# Patient Record
Sex: MALE | Race: BLACK OR AFRICAN AMERICAN | Employment: OTHER | URBAN - METROPOLITAN AREA
[De-identification: names, ages, dates, MRNs, and addresses within clinical notes are randomized per-mention and may not be internally consistent; named-entity substitution may affect disease eponyms.]

---

## 2017-06-07 ENCOUNTER — OFFICE VISIT (OUTPATIENT)
Dept: CARDIOLOGY CLINIC | Age: 72
End: 2017-06-07

## 2017-06-07 VITALS
HEART RATE: 90 BPM | DIASTOLIC BLOOD PRESSURE: 92 MMHG | HEIGHT: 66 IN | BODY MASS INDEX: 27.64 KG/M2 | SYSTOLIC BLOOD PRESSURE: 173 MMHG | WEIGHT: 172 LBS

## 2017-06-07 DIAGNOSIS — Z99.2 ESRD ON DIALYSIS (HCC): ICD-10-CM

## 2017-06-07 DIAGNOSIS — I10 ESSENTIAL HYPERTENSION, BENIGN: ICD-10-CM

## 2017-06-07 DIAGNOSIS — Z95.1 S/P CABG X 1: ICD-10-CM

## 2017-06-07 DIAGNOSIS — N18.6 ESRD ON DIALYSIS (HCC): ICD-10-CM

## 2017-06-07 DIAGNOSIS — I25.10 ATHEROSCLEROSIS OF NATIVE CORONARY ARTERY OF NATIVE HEART WITHOUT ANGINA PECTORIS: Primary | ICD-10-CM

## 2017-06-07 DIAGNOSIS — I08.0 MITRAL VALVE INSUFFICIENCY AND AORTIC VALVE INSUFFICIENCY: ICD-10-CM

## 2017-06-07 DIAGNOSIS — E78.5 OTHER AND UNSPECIFIED HYPERLIPIDEMIA: ICD-10-CM

## 2017-06-07 RX ORDER — METOPROLOL SUCCINATE 50 MG/1
50 TABLET, EXTENDED RELEASE ORAL DAILY
Qty: 90 TAB | Refills: 3 | Status: SHIPPED | OUTPATIENT
Start: 2017-06-07 | End: 2020-01-09

## 2017-06-07 RX ORDER — AMLODIPINE BESYLATE 2.5 MG/1
TABLET ORAL DAILY
COMMUNITY
End: 2020-01-09

## 2017-06-07 RX ORDER — ATORVASTATIN CALCIUM 10 MG/1
10 TABLET, FILM COATED ORAL DAILY
Qty: 90 TAB | Refills: 3 | Status: SHIPPED | OUTPATIENT
Start: 2017-06-07 | End: 2020-01-09

## 2017-06-07 RX ORDER — TAMSULOSIN HYDROCHLORIDE 0.4 MG/1
0.4 CAPSULE ORAL DAILY
COMMUNITY
End: 2020-01-09

## 2017-06-07 RX ORDER — WARFARIN SODIUM 5 MG/1
5 TABLET ORAL DAILY
COMMUNITY
End: 2020-01-09

## 2017-06-07 NOTE — PROGRESS NOTES
HISTORY OF PRESENT ILLNESS  Hollie Canchola is a 67 y.o. male. HPI Comments: Patient with cad,mr,ai,hcvd. On follow up patient denies any chest pains,sob, palpitation or other significant symptoms. cabg at [facility]-8/2016      Hypertension   The history is provided by the patient. This is a chronic problem. The problem occurs constantly. The problem has not changed since onset. Pertinent negatives include no chest pain and no shortness of breath. Review of Systems   Constitutional: Negative for chills and fever. HENT: Negative for nosebleeds. Eyes: Negative for blurred vision and double vision. Respiratory: Negative for cough, hemoptysis, sputum production, shortness of breath and wheezing. Cardiovascular: Negative for chest pain, palpitations, orthopnea, claudication, leg swelling and PND. Gastrointestinal: Negative for heartburn, nausea and vomiting. Musculoskeletal: Negative for myalgias. Skin: Negative for rash. Neurological: Negative for dizziness and weakness. Endo/Heme/Allergies: Does not bruise/bleed easily. Family History   Problem Relation Age of Onset    Hypertension Other        Past Medical History:   Diagnosis Date    Anemia NEC     Anemia in chronic kidney disease     BPH (benign prostatic hypertrophy)     Chronic kidney disease, unspecified     On dialysis    Coronary atherosclerosis of native coronary artery     Stable, no angina    ESRD on dialysis ([facility] [address] 5/18/10    Essential hypertension, benign     Elevated today, on multiple meds    Gout     Hypercholesterolemia     Hyperlipidemia     Mitral valve insufficiency and aortic valve insufficiency     Mod mr, mild ai    Osteomalacia, unspecified     Other and unspecified hyperlipidemia     On Lipitor.  LDL less than 60    Other chronic pulmonary heart diseases     PAP 50 MMHG    Pre-operative cardiovascular examination     For renal transplant    Pre-transplant evaluation for end stage renal disease     Renal mass     Secondary hyperparathyroidism (of renal origin)     Unspecified essential hypertension        Past Surgical History:   Procedure Laterality Date    HX KNEE ARTHROSCOPY      HX ORTHOPAEDIC      HX OTHER SURGICAL      Hx dialysis catheter     HX OTHER SURGICAL      Renal shunt    VASCULAR SURGERY PROCEDURE UNLIST  5/2010       No Known Allergies    Current Outpatient Prescriptions   Medication Sig    tamsulosin (FLOMAX) 0.4 mg capsule Take 0.4 mg by mouth daily.  warfarin (COUMADIN) 5 mg tablet Take 5 mg by mouth daily.  amLODIPine (NORVASC) 2.5 mg tablet Take  by mouth daily.  atorvastatin (LIPITOR) 10 mg tablet Take 1 Tab by mouth daily.  metoprolol succinate (TOPROL-XL) 50 mg XL tablet Take 1 Tab by mouth daily.  cholecalciferol (VITAMIN D3) 400 unit tab tablet Take  by mouth two (2) times a day.  aspirin 81 mg CpDR Take  by mouth.  cinacalcet (SENSIPAR) 30 mg tablet Take 60 mg by mouth daily.  isosorbide mononitrate ER (IMDUR) 30 mg tablet Take  by mouth daily.  sevelamer carbonate (RENVELA) 800 mg Tab tab Take  by mouth three (3) times daily. No current facility-administered medications for this visit. Visit Vitals    BP (!) 173/92    Pulse 90    Ht 5' 6\" (1.676 m)    Wt 78 kg (172 lb)    BMI 27.76 kg/m2         Physical Exam   Constitutional: He is oriented to person, place, and time. He appears well-developed and well-nourished. HENT:   Head: Normocephalic and atraumatic. Eyes: Conjunctivae are normal.   Neck: Neck supple. No JVD present. No tracheal deviation present. No thyromegaly present. Cardiovascular: Normal rate and regular rhythm. Exam reveals no gallop and no friction rub. Murmur heard. Holosystolic murmur is present with a grade of 2/6  at the lower left sternal border  Pulmonary/Chest: Breath sounds normal. No respiratory distress. He has no wheezes. He has no rales. He exhibits no tenderness.    Abdominal: Soft. There is no tenderness. Musculoskeletal: He exhibits no edema. Neurological: He is alert and oriented to person, place, and time. Skin: Skin is warm and dry. Psychiatric: He has a normal mood and affect. CARDIOLOGY STUDIES 8/1/2011 9/1/2010   Cardiac Cath Result - lad 80% mid long and aneurysmal dilatation, lcx, om1 100%, om3 small 40%, om5 30-40%, rca, aneurysmal dilatation, 90% pda, small   Echocardiogram - Complete Result EF 50%, PAP 50, mod MR, mod TR, mild AR; MODERATE PULMONARY HTN -   CORONARY ANGIOGRAPHY:cath:2010  Left Main Coronary Artery: The left main coronary artery is somewhat irregular and, like all of the coronaries, is somewhat aneurysmally dilated. The left main divides into the left anterior descending and circumflex branches in typical fashion. Left Anterior Descending Coronary Artery: The left anterior descending coronary artery gives rise to a very large first diagonal vessel. This vessel runs parallel to the left anterior descending over the anterolateral surface of the heart. The diagonal bifurcates terminally. The left anterior descending in the mid segment beyond the takeoff of the diagonal has a long area of 80% narrowing. Distally, the left anterior descending is a normal-caliber vessel and courses to the apex. The proximal portion of the left anterior descending at the site of the diagonal takeoff is aneurysmally dilated. Left Circumflex Coronary Artery: The left circumflex coronary artery consists of a total of 4 marginal vessels. The first is occluded with faint filling of the ghost distal vessel. The second is small and free of significant narrowing. The third has about 40% stenosis proximally but is small. A fourth is small and free of disease. The fifth marginal has some 30-40% narrowing at its origin. The entire AV groove portion of the circumflex is aneurysmally dilated and irregular.     Right Coronary Artery: The right coronary artery is dominant, giving rise to a small posterior descending branch and a posterior left ventricular branch. There is serial 20% stenosis in the proximal right with aneurysmal dilatation of the proximal right. The posterior descending is a small vessel and has a 90% stenosis distally. Beyond the stenosis, the vessel was extremely small and not suitable for coronary intervention or coronary artery bypass grafting. The posterior left ventricular branch of the right is totally occluded proximally and also is a tiny vessel. FINAL DIAGNOSES:  1. NORMAL LEFT VENTRICULAR FUNCTION. 2. THREE-VESSEL CORONARY ARTERY DISEASE. 3. ANEURYSMALLY DILATED CORONARY ARTERIES THAT ARE IRREGULAR AND ECTATIC. RECOMMENDATION: Mr. Viridiana Mccrary will have his risk factors optimized; specifically, he will be placed on aspirin and be certain that his lipid profile reflects a target LDL of less than 70. He will need functional testing to assess the possibility of the need for coronary intervention to the left anterior descending. He does have disease involving the first marginal, which is occluded; the posterolateral system of the right, which is occluded; and the posterior descending branch of the right, which has a high-grade stenosis but is a tiny vessel. However, the obtuse marginal, the posterolateral, and the posterior descending vessels are all exceedingly small and not suitable for coronary intervention or coronary artery bypass grafting. Ek2016  Sinus  Rhythm   -Left bundle branch block and left axis. ABNORMAL     SUMMARY:echo:2016  Left ventricle: Systolic function was normal. Ejection fraction was  estimated to be 55 %. No obvious wall motion abnormalities identified in  the views obtained. There was mild concentric hypertrophy. Doppler  parameters were consistent with abnormal left ventricular relaxation  (grade 1 diastolic dysfunction). Left atrium: The atrium was dilated. Mitral valve: There was trivial regurgitation.     Pulmonic valve: Transpulmonic velocity was increased due to increased  transvalvular flow. There was mild regurgitation. 8/2016  cabg  sinle vg to lad    [facility]results pending      Assessment       ICD-10-CM ICD-9-CM    1. Atherosclerosis of native coronary artery of native heart without angina pectoris I25.10 414.01     stable   2. Essential hypertension, benign I10 401.1     elevated  retstarted metoprolol   3. Other and unspecified hyperlipidemia E78.5 272.4     restrted lipitor   4. Mitral valve insufficiency and aortic valve insufficiency I08.0 396.3     mod mr,mild ai  stable     5. S/P CABG x 1 Z95.1 V45.81     vg to distal lad-8/2016   6. ESRD on dialysis ([facility] [address] N18.6 585.6     Z99.2 V45.11        Medications Discontinued During This Encounter   Medication Reason    atorvastatin (LIPITOR) 10 mg tablet Reorder    metoprolol succinate (TOPROL-XL) 25 mg XL tablet Reorder       Orders Placed This Encounter    atorvastatin (LIPITOR) 10 mg tablet     Sig: Take 1 Tab by mouth daily. Dispense:  90 Tab     Refill:  3    metoprolol succinate (TOPROL-XL) 50 mg XL tablet     Sig: Take 1 Tab by mouth daily. Dispense:  90 Tab     Refill:  3       Follow-up Disposition:  Return in about 6 months (around 12/7/2017).

## 2017-06-07 NOTE — MR AVS SNAPSHOT
Visit Information Date & Time Provider Department Dept. Phone Encounter #  
 6/7/2017 12:45 PM Jan Llamas MD [facility] [address] [place] [address] [id number] Follow-up Instructions Return in about 6 months (around 12/7/2017). Your Appointments 12/6/2017 10:45 AM  
Office Visit with Jan Llamas MD  
[facility]) Appt Note: [address] [facility], [address] [facility] [address] 5[address] Charleen Hess, [address] [place] [address] Upcoming Health Maintenance Date Due Hepatitis C Screening 1945 DTaP/Tdap/Td series (1 - Tdap) 5/30/1966 FOBT Q 1 YEAR AGE 50-75 5/30/1995 ZOSTER VACCINE AGE 60> 5/30/2005 GLAUCOMA SCREENING Q2Y 5/30/2010 Pneumococcal 65+ High/Highest Risk (1 of 2 - PCV13) 5/30/2010 MEDICARE YEARLY EXAM 5/30/2010 INFLUENZA AGE 9 TO ADULT 8/1/2017 Allergies as of 6/7/2017  Review Complete On: 6/7/2017 By: Jan Llamas MD  
 No Known Allergies Current Immunizations  Never Reviewed No immunizations on file. Not reviewed this visit You Were Diagnosed With   
  
 Codes Comments Atherosclerosis of native coronary artery of native heart without angina pectoris    -  Primary ICD-10-CM: I25.10 ICD-9-CM: 414.01 stable Essential hypertension, benign     ICD-10-CM: I10 
ICD-9-CM: 401.1 elevated 
retstarted metoprolol Other and unspecified hyperlipidemia     ICD-10-CM: E78.5 ICD-9-CM: 272.4 restrted lipitor Mitral valve insufficiency and aortic valve insufficiency     ICD-10-CM: I08.0 ICD-9-CM: 396.3 mod mr,mild ai 
stable S/P CABG x 1     ICD-10-CM: Z95.1 ICD-9-CM: V45.81 vg to distal lad-8/2016 ESRD on dialysis [facility])     ICD-10-CM: N18.6, Z99.2 ICD-9-CM: 585.6, V45.11 Vitals BP Pulse Height(growth percentile) Weight(growth percentile) BMI Smoking Status (!) 173/92 90 5' 6\" (1.676 m) 172 lb (78 kg) 27.76 kg/m2 Never Smoker Vitals History BMI and BSA Data Body Mass Index Body Surface Area  
 27.76 kg/m 2 1.91 m 2 Preferred Pharmacy Pharmacy Name Phone [address] [facility], [address] [place] [address] [phone] Your Updated Medication List  
  
   
This list is accurate as of: 6/7/17  1:02 PM.  Always use your most recent med list. amLODIPine 2.5 mg tablet Commonly known as:  [place] Staggers Take  by mouth daily. aspirin 81 mg Cpdr  
Take  by mouth. atorvastatin 10 mg tablet Commonly known as:  LIPITOR Take 1 Tab by mouth daily. cholecalciferol 400 unit Tab tablet Commonly known as:  VITAMIN D3 Take  by mouth two (2) times a day. isosorbide mononitrate ER 30 mg tablet Commonly known as:  IMDUR Take  by mouth daily. metoprolol succinate 50 mg XL tablet Commonly known as:  TOPROL-XL Take 1 Tab by mouth daily. RENVELA 800 mg Tab tab Generic drug:  sevelamer carbonate Take  by mouth three (3) times daily. SENSIPAR 30 mg tablet Generic drug:  cinacalcet Take 60 mg by mouth daily. tamsulosin 0.4 mg capsule Commonly known as:  FLOMAX Take 0.4 mg by mouth daily. warfarin 5 mg tablet Commonly known as:  COUMADIN Take 5 mg by mouth daily. Prescriptions Printed Refills  
 atorvastatin (LIPITOR) 10 mg tablet 3 Sig: Take 1 Tab by mouth daily. Class: Print Route: Oral  
 metoprolol succinate (TOPROL-XL) 50 mg XL tablet 3 Sig: Take 1 Tab by mouth daily. Class: Print Route: Oral  
  
Follow-up Instructions Return in about 6 months (around 12/7/2017). Introducing hospitals & HEALTH SERVICES! Dear Santy Power: 
Thank you for requesting a [employer] account.   Our records indicate that you have previously registered for a [employer] account but its currently inactive. Please call our [employer] support line at [phone]. Additional Information If you have questions, please visit the Frequently Asked Questions section of the [employer] website at [email or web address] Remember, [employer] is NOT to be used for urgent needs. For medical emergencies, dial 911. Now available from your iPhone and Android! Please provide this summary of care documentation to your next provider. Your primary care clinician is listed as [address] [place] [address]. If you have any questions after today's visit, please call [phone].

## 2017-06-07 NOTE — LETTER
Ermias Arriaza. 
[birth year] 
 
6/7/2017 Dear Nisha Mccallum MD 
 
I had the pleasure of evaluating  Mr. Joann Puentes in office today. Below are the relevant portions of my assessment and plan of care. ICD-10-CM ICD-9-CM 1. Atherosclerosis of native coronary artery of native heart without angina pectoris I25.10 414.01   
 stable 2. Essential hypertension, benign I10 401.1   
 elevated 
retstarted metoprolol 3. Other and unspecified hyperlipidemia E78.5 272.4   
 restrted lipitor 4. Mitral valve insufficiency and aortic valve insufficiency I08.0 396.3   
 mod mr,mild ai 
stable 5. S/P CABG x 1 Z95.1 V45.81   
 vg to distal lad-8/2016 Current Outpatient Prescriptions Medication Sig Dispense Refill  tamsulosin (FLOMAX) 0.4 mg capsule Take 0.4 mg by mouth daily.  warfarin (COUMADIN) 5 mg tablet Take 5 mg by mouth daily.  amLODIPine (NORVASC) 2.5 mg tablet Take  by mouth daily.  cholecalciferol (VITAMIN D3) 400 unit tab tablet Take  by mouth two (2) times a day.  aspirin 81 mg CpDR Take  by mouth.  cinacalcet (SENSIPAR) 30 mg tablet Take 60 mg by mouth daily.  metoprolol succinate (TOPROL-XL) 25 mg XL tablet Take  by mouth daily.  isosorbide mononitrate ER (IMDUR) 30 mg tablet Take  by mouth daily.  atorvastatin (LIPITOR) 10 mg tablet Take 1 Tab by mouth daily. 90 Tab 3  
 sevelamer carbonate (RENVELA) 800 mg Tab tab Take  by mouth three (3) times daily. Orders Placed This Encounter  tamsulosin (FLOMAX) 0.4 mg capsule Sig: Take 0.4 mg by mouth daily.  warfarin (COUMADIN) 5 mg tablet Sig: Take 5 mg by mouth daily.  amLODIPine (NORVASC) 2.5 mg tablet Sig: Take  by mouth daily. If you have questions, please do not hesitate to call me. I look forward to following Mr. Joann Puentes along with you. Sincerely, Mani Davidson MD

## 2020-01-09 ENCOUNTER — OFFICE VISIT (OUTPATIENT)
Dept: CARDIOLOGY CLINIC | Age: 75
End: 2020-01-09

## 2020-01-09 VITALS
OXYGEN SATURATION: 99 % | SYSTOLIC BLOOD PRESSURE: 136 MMHG | DIASTOLIC BLOOD PRESSURE: 74 MMHG | HEIGHT: 66 IN | WEIGHT: 165 LBS | HEART RATE: 77 BPM | BODY MASS INDEX: 26.52 KG/M2

## 2020-01-09 DIAGNOSIS — Z95.1 S/P CABG X 1: ICD-10-CM

## 2020-01-09 DIAGNOSIS — I35.0 NONRHEUMATIC AORTIC VALVE STENOSIS: ICD-10-CM

## 2020-01-09 DIAGNOSIS — Z99.2 ESRD ON DIALYSIS (HCC): ICD-10-CM

## 2020-01-09 DIAGNOSIS — J90 PLEURAL EFFUSION ON RIGHT: ICD-10-CM

## 2020-01-09 DIAGNOSIS — E78.5 HYPERLIPIDEMIA, UNSPECIFIED HYPERLIPIDEMIA TYPE: ICD-10-CM

## 2020-01-09 DIAGNOSIS — I10 ESSENTIAL HYPERTENSION, BENIGN: ICD-10-CM

## 2020-01-09 DIAGNOSIS — I42.9 CARDIOMYOPATHY, UNSPECIFIED TYPE (HCC): ICD-10-CM

## 2020-01-09 DIAGNOSIS — I25.118 ATHEROSCLEROSIS OF NATIVE CORONARY ARTERY OF NATIVE HEART WITH OTHER FORM OF ANGINA PECTORIS (HCC): Primary | ICD-10-CM

## 2020-01-09 DIAGNOSIS — I08.0 MITRAL VALVE INSUFFICIENCY AND AORTIC VALVE INSUFFICIENCY: ICD-10-CM

## 2020-01-09 DIAGNOSIS — R06.02 SOB (SHORTNESS OF BREATH) ON EXERTION: ICD-10-CM

## 2020-01-09 DIAGNOSIS — N18.6 ESRD ON DIALYSIS (HCC): ICD-10-CM

## 2020-01-09 RX ORDER — LORATADINE 10 MG/1
10 TABLET ORAL
COMMUNITY

## 2020-01-09 RX ORDER — DOXYCYCLINE 100 MG/1
100 TABLET ORAL 2 TIMES DAILY
COMMUNITY
End: 2020-04-02

## 2020-01-09 RX ORDER — CARVEDILOL 25 MG/1
25 TABLET ORAL 2 TIMES DAILY WITH MEALS
COMMUNITY

## 2020-01-09 RX ORDER — LISINOPRIL 5 MG/1
5 TABLET ORAL DAILY
Qty: 90 TAB | Refills: 3 | Status: SHIPPED | OUTPATIENT
Start: 2020-01-09

## 2020-01-09 RX ORDER — ATORVASTATIN CALCIUM 20 MG/1
20 TABLET, FILM COATED ORAL DAILY
Qty: 90 TAB | Refills: 3 | Status: SHIPPED | OUTPATIENT
Start: 2020-01-09 | End: 2020-03-06 | Stop reason: SDUPTHER

## 2020-01-09 RX ORDER — LISINOPRIL 5 MG/1
5 TABLET ORAL DAILY
Qty: 90 TAB | Refills: 3 | Status: SHIPPED | OUTPATIENT
Start: 2020-01-09 | End: 2020-01-09 | Stop reason: SDUPTHER

## 2020-01-09 NOTE — PROGRESS NOTES
1. Have you been to the ER, urgent care clinic since your last visit? Hospitalized since your last visit? Yes [facility] non cardiac    2. Have you seen or consulted any other health care providers outside of the [address] [place] [address] since your last visit? Include any pap smears or colon screening.   Yes [facility]

## 2020-01-09 NOTE — PROGRESS NOTES
HISTORY OF PRESENT ILLNESS  Giovanni Botello is a 76 y.o. male. Patient with cad,mr,ai,hcvd. On follow up patient denies any chest pains,sob, palpitation or other significant symptoms. cabg at [facility]-8/2016 1/2020  Patient is seen today for cardiac evaluation. His increased shortness of breath. He was noted to have a right pleural effusion which was tapped at [facility].  Shortness of breath is improved since then he denies any chest pain at present. Hypertension   The history is provided by the patient. This is a chronic problem. The problem occurs constantly. The problem has not changed since onset. Associated symptoms include shortness of breath. Pertinent negatives include no chest pain. Review of Systems   Constitutional: Negative for chills and fever. HENT: Negative for nosebleeds. Eyes: Negative for blurred vision and double vision. Respiratory: Positive for shortness of breath. Negative for cough, hemoptysis, sputum production and wheezing. Cardiovascular: Negative for chest pain, palpitations, orthopnea, claudication, leg swelling and PND. Gastrointestinal: Negative for heartburn, nausea and vomiting. Musculoskeletal: Negative for myalgias. Skin: Negative for rash. Neurological: Negative for dizziness and weakness. Endo/Heme/Allergies: Does not bruise/bleed easily.      Family History   Problem Relation Age of Onset    Hypertension Other        Past Medical History:   Diagnosis Date    Anemia NEC     Anemia in chronic kidney disease     BPH (benign prostatic hypertrophy)     Chronic kidney disease, unspecified     On dialysis    Coronary atherosclerosis of native coronary artery     Stable, no angina    ESRD on dialysis ([facility] [address] 5/18/10    Essential hypertension, benign     Elevated today, on multiple meds    Gout     Hypercholesterolemia     Hyperlipidemia     Mitral valve insufficiency and aortic valve insufficiency     Mod mr, mild ai    Osteomalacia, unspecified     Other and unspecified hyperlipidemia     On Lipitor. LDL less than 60    Other chronic pulmonary heart diseases     PAP 50 MMHG    Pre-operative cardiovascular examination     For renal transplant    Pre-transplant evaluation for end stage renal disease     Renal mass     Secondary hyperparathyroidism (of renal origin)     Unspecified essential hypertension        Past Surgical History:   Procedure Laterality Date    HX KNEE ARTHROSCOPY      HX ORTHOPAEDIC      HX OTHER SURGICAL      Hx dialysis catheter     HX OTHER SURGICAL      Renal shunt    VASCULAR SURGERY PROCEDURE UNLIST  5/2010       No Known Allergies    Current Outpatient Medications   Medication Sig    loratadine (CLARITIN) 10 mg tablet Take 10 mg by mouth.  carvedilol (COREG) 25 mg tablet Take 25 mg by mouth two (2) times daily (with meals).  doxycycline (ADOXA) 100 mg tablet Take 100 mg by mouth two (2) times a day.  lisinopril (PRINIVIL, ZESTRIL) 5 mg tablet Take 1 Tab by mouth daily.  atorvastatin (LIPITOR) 20 mg tablet Take 1 Tab by mouth daily.  cholecalciferol (VITAMIN D3) 400 unit tab tablet Take  by mouth two (2) times a day.  aspirin 81 mg CpDR Take  by mouth.  cinacalcet (SENSIPAR) 30 mg tablet Take 60 mg by mouth daily.  sevelamer carbonate (RENVELA) 800 mg Tab tab Take  by mouth three (3) times daily. No current facility-administered medications for this visit. Visit Vitals  /74   Pulse 77   Ht 5' 6\" (1.676 m)   Wt 74.8 kg (165 lb)   SpO2 99%   BMI 26.63 kg/m²         Physical Exam   Constitutional: He is oriented to person, place, and time. He appears well-developed and well-nourished. HENT:   Head: Normocephalic and atraumatic. Eyes: Conjunctivae are normal.   Neck: Neck supple. No JVD present. No tracheal deviation present. No thyromegaly present. Cardiovascular: Normal rate and regular rhythm. Exam reveals no gallop and no friction rub. Murmur heard. Holosystolic murmur is present with a grade of 2/6 at the lower left sternal border. Early systolic murmur is also present at the upper right sternal border and lower left sternal border. Pulmonary/Chest: Breath sounds normal. No respiratory distress. He has no wheezes. He has no rales. He exhibits no tenderness. Abdominal: Soft. There is no tenderness. Musculoskeletal:         General: No edema. Neurological: He is alert and oriented to person, place, and time. Skin: Skin is warm and dry. Psychiatric: He has a normal mood and affect. No flowsheet data found. CORONARY ANGIOGRAPHY:cath:2010  Left Main Coronary Artery: The left main coronary artery is somewhat irregular and, like all of the coronaries, is somewhat aneurysmally dilated. The left main divides into the left anterior descending and circumflex branches in typical fashion. Left Anterior Descending Coronary Artery: The left anterior descending coronary artery gives rise to a very large first diagonal vessel. This vessel runs parallel to the left anterior descending over the anterolateral surface of the heart. The diagonal bifurcates terminally. The left anterior descending in the mid segment beyond the takeoff of the diagonal has a long area of 80% narrowing. Distally, the left anterior descending is a normal-caliber vessel and courses to the apex. The proximal portion of the left anterior descending at the site of the diagonal takeoff is aneurysmally dilated. Left Circumflex Coronary Artery: The left circumflex coronary artery consists of a total of 4 marginal vessels. The first is occluded with faint filling of the ghost distal vessel. The second is small and free of significant narrowing. The third has about 40% stenosis proximally but is small. A fourth is small and free of disease. The fifth marginal has some 30-40% narrowing at its origin.  The entire AV groove portion of the circumflex is aneurysmally dilated and irregular. Right Coronary Artery: The right coronary artery is dominant, giving rise to a small posterior descending branch and a posterior left ventricular branch. There is serial 20% stenosis in the proximal right with aneurysmal dilatation of the proximal right. The posterior descending is a small vessel and has a 90% stenosis distally. Beyond the stenosis, the vessel was extremely small and not suitable for coronary intervention or coronary artery bypass grafting. The posterior left ventricular branch of the right is totally occluded proximally and also is a tiny vessel. FINAL DIAGNOSES:  1. NORMAL LEFT VENTRICULAR FUNCTION. 2. THREE-VESSEL CORONARY ARTERY DISEASE. 3. ANEURYSMALLY DILATED CORONARY ARTERIES THAT ARE IRREGULAR AND ECTATIC. RECOMMENDATION: Mr. Jenny Denton will have his risk factors optimized; specifically, he will be placed on aspirin and be certain that his lipid profile reflects a target LDL of less than 70. He will need functional testing to assess the possibility of the need for coronary intervention to the left anterior descending. He does have disease involving the first marginal, which is occluded; the posterolateral system of the right, which is occluded; and the posterior descending branch of the right, which has a high-grade stenosis but is a tiny vessel. However, the obtuse marginal, the posterolateral, and the posterior descending vessels are all exceedingly small and not suitable for coronary intervention or coronary artery bypass grafting. Ek2016  Sinus  Rhythm   -Left bundle branch block and left axis. ABNORMAL     SUMMARY:echo:2016  Left ventricle: Systolic function was normal. Ejection fraction was  estimated to be 55 %. No obvious wall motion abnormalities identified in  the views obtained. There was mild concentric hypertrophy. Doppler  parameters were consistent with abnormal left ventricular relaxation  (grade 1 diastolic dysfunction). Left atrium:  The atrium was dilated. Mitral valve: There was trivial regurgitation. Pulmonic valve: Transpulmonic velocity was increased due to increased  transvalvular flow. There was mild regurgitation. 8/2016  cabg  sinle vg to lad    [facility]-results pending      Assessment       ICD-10-CM ICD-9-CM    1. Atherosclerosis of native coronary artery of native heart with other form of angina pectoris ([facility] [address] I25.118 414.01 ECHO ADULT COMPLETE     413.9 NUCLEAR CARDIAC STRESS TEST    Increase shortness of breath. Recent pleural tap. Past CABG   2. Essential hypertension, benign I10 401.1     Stable on treatment   3. Mitral valve insufficiency and aortic valve insufficiency I08.0 396.3 ECHO ADULT COMPLETE      NUCLEAR CARDIAC STRESS TEST   4. [facility] N18.6 585.6     Z99.2 V45.11     Tolerating   5. Hyperlipidemia, unspecified hyperlipidemia type E78.5 272.4     Continue treatment lab with PCP   6. S/P CABG x 1 Z95.1 V45.81     2016 single vein graft to LAD   7. SOB (shortness of breath) on exertion R06.02 786.05     Pleural effusion. Unclear etiology possible CHF although pulmonary etiology needs to be ruled out history of asbestosis   8. Pleural effusion on right J90 511.9     Status post tap at [facility]   9. Nonrheumatic aortic valve stenosis I35.0 424.1     Mild on recent echo monitor   10. Cardiomyopathy, unspecified type ([facility] I42.9 425.4     40-45% ejection fraction on echo. 34 % on nuclear. Recheck echo.   Add lisinopril       Medications Discontinued During This Encounter   Medication Reason    tamsulosin (FLOMAX) 0.4 mg capsule Not A Current Medication    warfarin (COUMADIN) 5 mg tablet Not A Current Medication    amLODIPine (NORVASC) 2.5 mg tablet Not A Current Medication    atorvastatin (LIPITOR) 10 mg tablet Not A Current Medication    metoprolol succinate (TOPROL-XL) 50 mg XL tablet Not A Current Medication    isosorbide mononitrate ER (IMDUR) 30 mg tablet Not A Current Medication    lisinopril (PRINIVIL, ZESTRIL) 5 mg tablet Reorder   1/2020  Patient referred back from [place]. Recent nuclear stress test shows ischemia in inferior septal and apical area with some areas of fixed defect . anterior wall was not reported to have any defect. This likely indicates that vein graft to LAD is patent. Patient had diffuse disease in other areas so likely not surprising that he has defect in other area. His ejection fraction was reported at 34%. His echo from 8/2019 showed 40 to 45% ejection fraction. He had moderate pulmonary hypertension and aortic valve disease. We will follow-up echo to see the ejection fraction if it has dropped further. I have added lisinopril he is already on Coreg    Orders Placed This Encounter    DISCONTD: lisinopril (PRINIVIL, ZESTRIL) 5 mg tablet     Sig: Take 1 Tab by mouth daily. Dispense:  90 Tab     Refill:  3    lisinopril (PRINIVIL, ZESTRIL) 5 mg tablet     Sig: Take 1 Tab by mouth daily. Dispense:  90 Tab     Refill:  3    atorvastatin (LIPITOR) 20 mg tablet     Sig: Take 1 Tab by mouth daily. Dispense:  90 Tab     Refill:  3       Follow-up and Dispositions    · Return for F/u after tests.

## 2020-02-05 ENCOUNTER — TELEPHONE (OUTPATIENT)
Dept: CARDIOLOGY CLINIC | Age: 75
End: 2020-02-05

## 2020-02-05 NOTE — TELEPHONE ENCOUNTER
----- Message from Sinan Angeles MD sent at 2/5/2020 11:01 AM EST -----  Regarding: RE: testing  Please get echo done. No need for nuclear stress test.  Follow-up in office after echo for possible cardiac catheterization  ----- Message -----  From: Franck Torres  Sent: 2/5/2020   8:44 AM EST  To: Sinan Angeles MD  Subject: testing                                          You ordered a Nuclear Stress and Echocardiogram on 1/9/20. Patient had nuclear in 11/19 and echo on 8/19. Do we need to repeat?  Please advise

## 2020-02-19 ENCOUNTER — OFFICE VISIT (OUTPATIENT)
Dept: CARDIOLOGY CLINIC | Age: 75
End: 2020-02-19

## 2020-02-19 VITALS
TEMPERATURE: 95.9 F | SYSTOLIC BLOOD PRESSURE: 130 MMHG | HEART RATE: 79 BPM | OXYGEN SATURATION: 100 % | WEIGHT: 168.4 LBS | BODY MASS INDEX: 27.06 KG/M2 | DIASTOLIC BLOOD PRESSURE: 68 MMHG | HEIGHT: 66 IN

## 2020-02-19 DIAGNOSIS — I08.0 MITRAL VALVE INSUFFICIENCY AND AORTIC VALVE INSUFFICIENCY: ICD-10-CM

## 2020-02-19 DIAGNOSIS — Z95.1 S/P CABG X 1: ICD-10-CM

## 2020-02-19 DIAGNOSIS — N18.6 ESRD ON DIALYSIS (HCC): ICD-10-CM

## 2020-02-19 DIAGNOSIS — I10 ESSENTIAL HYPERTENSION, BENIGN: ICD-10-CM

## 2020-02-19 DIAGNOSIS — Z99.2 ESRD ON DIALYSIS (HCC): ICD-10-CM

## 2020-02-19 DIAGNOSIS — I25.118 ATHEROSCLEROSIS OF NATIVE CORONARY ARTERY OF NATIVE HEART WITH OTHER FORM OF ANGINA PECTORIS (HCC): Primary | ICD-10-CM

## 2020-02-19 DIAGNOSIS — I42.9 CARDIOMYOPATHY, UNSPECIFIED TYPE (HCC): ICD-10-CM

## 2020-02-19 NOTE — PROGRESS NOTES
1. Have you been to the ER, urgent care clinic since your last visit? Hospitalized since your last visit? 02/2020/[facility]/Sinus Infection    2. Have you seen or consulted any other health care providers outside of the [address] [place] [address] since your last visit? Include any pap smears or colon screening.  No

## 2020-02-19 NOTE — H&P (VIEW-ONLY)
HISTORY OF PRESENT ILLNESS Ruben Dumont is a 76 y.o. male. Patient with cad,mr,ai,hcvd. On follow up patient denies any chest pains,sob, palpitation or other significant symptoms. [facility] 1/2020 Patient is seen today for cardiac evaluation. His increased shortness of breath. He was noted to have a right pleural effusion which was tapped at [facility].  Shortness of breath is improved since then he denies any chest pain at present. 2/2020 Patient is here for follow up of diagnostic tests. Results will be discussed. Hypertension The history is provided by the patient. This is a chronic problem. The problem occurs constantly. The problem has not changed since onset. Associated symptoms include shortness of breath. Pertinent negatives include no chest pain. Review of Systems Constitutional: Negative for chills and fever. HENT: Negative for nosebleeds. Eyes: Negative for blurred vision and double vision. Respiratory: Positive for shortness of breath. Negative for cough, hemoptysis, sputum production and wheezing. Cardiovascular: Negative for chest pain, palpitations, orthopnea, claudication, leg swelling and PND. Gastrointestinal: Negative for heartburn, nausea and vomiting. Musculoskeletal: Negative for myalgias. Skin: Negative for rash. Neurological: Negative for dizziness and weakness. Endo/Heme/Allergies: Does not bruise/bleed easily. Family History Problem Relation Age of Onset  Hypertension Other Past Medical History:  
Diagnosis Date  Anemia NEC Anemia in chronic kidney disease  BPH (benign prostatic hypertrophy)  Chronic kidney disease, unspecified On dialysis  Coronary atherosclerosis of native coronary artery Stable, no angina  ESRD on dialysis ([facility] [address] 5/18/10  Essential hypertension, benign Elevated today, on multiple meds  Gout  Hypercholesterolemia  Hyperlipidemia  Mitral valve insufficiency and aortic valve insufficiency Mod mr, mild ai  
 Osteomalacia, unspecified  Other and unspecified hyperlipidemia On Lipitor. LDL less than 60  
 Other chronic pulmonary heart diseases PAP 50 MMHG  Pre-operative cardiovascular examination For renal transplant  Pre-transplant evaluation for end stage renal disease  Renal mass  Secondary hyperparathyroidism (of renal origin)  Unspecified essential hypertension Past Surgical History:  
Procedure Laterality Date  HX KNEE ARTHROSCOPY    
 HX ORTHOPAEDIC    
 HX OTHER SURGICAL Hx dialysis catheter  HX OTHER SURGICAL Renal shunt  VASCULAR SURGERY PROCEDURE UNLIST  5/2010 No Known Allergies Current Outpatient Medications Medication Sig  loratadine (CLARITIN) 10 mg tablet Take 10 mg by mouth.  carvedilol (COREG) 25 mg tablet Take 25 mg by mouth two (2) times daily (with meals).  doxycycline (ADOXA) 100 mg tablet Take 100 mg by mouth two (2) times a day.  lisinopril (PRINIVIL, ZESTRIL) 5 mg tablet Take 1 Tab by mouth daily.  atorvastatin (LIPITOR) 20 mg tablet Take 1 Tab by mouth daily.  cholecalciferol (VITAMIN D3) 400 unit tab tablet Take  by mouth two (2) times a day.  aspirin 81 mg CpDR Take  by mouth.  cinacalcet (SENSIPAR) 30 mg tablet Take 60 mg by mouth daily.  sevelamer carbonate (RENVELA) 800 mg Tab tab Take  by mouth three (3) times daily. No current facility-administered medications for this visit. Visit Vitals /68 (BP 1 Location: Left arm, BP Patient Position: Sitting) Pulse 79 Temp 95.9 °F (35.5 °C) (Oral) Ht 5' 6\" (1.676 m) Wt 76.4 kg (168 lb 6.4 oz) SpO2 100% BMI 27.18 kg/m² Physical Exam  
Constitutional: He is oriented to person, place, and time. He appears well-developed and well-nourished. HENT:  
Head: Normocephalic and atraumatic.   
Eyes: Conjunctivae are normal.  
 Neck: Neck supple. No JVD present. No tracheal deviation present. No thyromegaly present. Cardiovascular: Normal rate and regular rhythm. Exam reveals no gallop and no friction rub. Murmur heard. Holosystolic murmur is present with a grade of 2/6 at the lower left sternal border. Early systolic murmur is also present at the upper right sternal border and lower left sternal border. Pulmonary/Chest: Breath sounds normal. No respiratory distress. He has no wheezes. He has no rales. He exhibits no tenderness. Abdominal: Soft. There is no abdominal tenderness. Musculoskeletal:     
   General: No edema. Neurological: He is alert and oriented to person, place, and time. Skin: Skin is warm and dry. Psychiatric: He has a normal mood and affect. No flowsheet data found. CORONARY ANGIOGRAPHY:cath:2010 Left Main Coronary Artery: The left main coronary artery is somewhat irregular and, like all of the coronaries, is somewhat aneurysmally dilated. The left main divides into the left anterior descending and circumflex branches in typical fashion. Left Anterior Descending Coronary Artery: The left anterior descending coronary artery gives rise to a very large first diagonal vessel. This vessel runs parallel to the left anterior descending over the anterolateral surface of the heart. The diagonal bifurcates terminally. The left anterior descending in the mid segment beyond the takeoff of the diagonal has a long area of 80% narrowing. Distally, the left anterior descending is a normal-caliber vessel and courses to the apex. The proximal portion of the left anterior descending at the site of the diagonal takeoff is aneurysmally dilated. Left Circumflex Coronary Artery: The left circumflex coronary artery consists of a total of 4 marginal vessels. The first is occluded with faint filling of the ghost distal vessel.  The second is small and free of significant narrowing. The third has about 40% stenosis proximally but is small. A fourth is small and free of disease. The fifth marginal has some 30-40% narrowing at its origin. The entire AV groove portion of the circumflex is aneurysmally dilated and irregular. Right Coronary Artery: The right coronary artery is dominant, giving rise to a small posterior descending branch and a posterior left ventricular branch. There is serial 20% stenosis in the proximal right with aneurysmal dilatation of the proximal right. The posterior descending is a small vessel and has a 90% stenosis distally. Beyond the stenosis, the vessel was extremely small and not suitable for coronary intervention or coronary artery bypass grafting. The posterior left ventricular branch of the right is totally occluded proximally and also is a tiny vessel. FINAL DIAGNOSES: 
1. NORMAL LEFT VENTRICULAR FUNCTION. 2. THREE-VESSEL CORONARY ARTERY DISEASE. 3. ANEURYSMALLY DILATED CORONARY ARTERIES THAT ARE IRREGULAR AND ECTATIC. RECOMMENDATION: Mr. Ines Boswell will have his risk factors optimized; specifically, he will be placed on aspirin and be certain that his lipid profile reflects a target LDL of less than 70. He will need functional testing to assess the possibility of the need for coronary intervention to the left anterior descending. He does have disease involving the first marginal, which is occluded; the posterolateral system of the right, which is occluded; and the posterior descending branch of the right, which has a high-grade stenosis but is a tiny vessel. However, the obtuse marginal, the posterolateral, and the posterior descending vessels are all exceedingly small and not suitable for coronary intervention or coronary artery bypass grafting. Ek2016 Sinus  Rhythm  
-Left bundle branch block and left axis. ABNORMAL  
 
SUMMARY:echo:2016 Left ventricle: Systolic function was normal. Ejection fraction was estimated to be 55 %. No obvious wall motion abnormalities identified in 
the views obtained. There was mild concentric hypertrophy. Doppler 
parameters were consistent with abnormal left ventricular relaxation 
(grade 1 diastolic dysfunction). Left atrium: The atrium was dilated. Mitral valve: There was trivial regurgitation. Pulmonic valve: Transpulmonic velocity was increased due to increased 
transvalvular flow. There was mild regurgitation. 8/2016 
cabg 
sinle vg to lad [facility] 6/1/2017-results pending 1/2020 nuclear stress test at [facility] 
Inferior inferoapical ischemia. Wall motion abnormality with 34% ejection fraction Interpretation Summary 2/2020 · Normal cavity size. Mild concentric hypertrophy. Moderate systolic dysfunction. Estimated left ventricular ejection fraction is 35 - 40%. Left ventricular global hypokinesis. Moderate (grade 2) left ventricular diastolic dysfunction. · Moderately dilated right ventricle. · Moderately dilated right atrium. · Aortic valve leaflet calcification present. Aortic valve mean gradient is 10.9 mmHg. Mild aortic valve stenosis is present. · Mitral valve thickening. Mild mitral valve regurgitation is present. · Mild tricuspid valve regurgitation is present. Pulmonary arterial systolic pressure is 18.3 mmHg. Moderate pulmonary hypertension. · Mild pulmonic valve regurgitation is present. Assessment ICD-10-CM ICD-9-CM 1. Atherosclerosis of native coronary artery of native heart with other form of angina pectoris ([address] O62.466 250.80 METABOLIC PANEL, BASIC  
  413.9 PROTHROMBIN TIME + INR  
   CBC WITH AUTOMATED DIFF  
   [place] CATH PLACEMENT & NJX CORONARY ART ANGIO IMG S&I Abnormal nuclear stress test with inferoapical ischemia. With wall motion abnormality.   
2. Mitral valve insufficiency and aortic valve insufficiency M22.2 985.4 METABOLIC PANEL, BASIC  
   PROTHROMBIN TIME + INR  
 CBC WITH AUTOMATED DIFF  
   [place] CATH PLACEMENT & NJX CORONARY ART ANGIO IMG S&I Stable. Trivial.  
3. Essential hypertension, benign I10 401.1 Stable monitor 4. ESRD on dialysis ([facility] [address] N18.6 585.6 Z99.2 V45.11 Stable 5. S/P CABG x 1 Z95.1 V45.81   
6. Cardiomyopathy, unspecified type ([address] I42.9 425.4 Worsening ejection fraction. Adjust medication. Abnormal nuclear scan will proceed with cardiac cath to evaluate further There are no discontinued medications. 1/2020 Patient referred back from [facility]. Recent nuclear stress test shows ischemia in inferior septal and apical area with some areas of fixed defect . anterior wall was not reported to have any defect. This likely indicates that vein graft to LAD is patent. Patient had diffuse disease in other areas so likely not surprising that he has defect in other area. His ejection fraction was reported at 34%. His echo from 8/2019 showed 40 to 45% ejection fraction. He had moderate pulmonary hypertension and aortic valve disease. We will follow-up echo to see the ejection fraction if it has dropped further. I have added lisinopril he is already on Coreg 2/2020 Continue current medication. LV ejection fraction is reduced. With abnormal stress test prior CABG and history of diffuse disease in other area will proceed further with evaluation by cardiac catheterization. THE PATIENT UNDERSTANDS THAT ALTHOUGH RARE, SEVERE UNEXPECTED COMPLICATIONS CAN OCCUR WITH EACH TYPE OF CARDIAC CATH PROCEDURE. THESE RISKS INCLUDE,  BUT ARE NOT LIMITED TO: ALLERGIC REACTION, INFECTION, BLEEDING, BLOOD VESSEL INJURY, KIDNEY INJURY FROM X-RAY DYE, PUNCTURE OF THE HEART/LUNGS,  
EMERGENT OPEN HEART SURGERY, HEART ATTACK, STROKE, CARDIAC ARREST OR DEATH OR NEED FOR EMERGENCY CARDIAC BYPASS SURGERY Orders Placed This Encounter  METABOLIC PANEL, BASIC Standing Status:   Future Standing Expiration Date:   3/20/2020  PROTHROMBIN TIME + INR Standing Status:   Future Standing Expiration Date:   3/20/2020  CBC WITH AUTOMATED DIFF Standing Status:   Future Standing Expiration Date:   3/20/2020  [place] CATH PLACEMENT & NJX CORONARY ART ANGIO IMG S&I Follow-up and Dispositions · Return in about 4 weeks (around 3/18/2020).

## 2020-02-19 NOTE — PROGRESS NOTES
HISTORY OF PRESENT ILLNESS  Karen Goff is a 76 y.o. male. Patient with cad,mr,ai,hcvd. On follow up patient denies any chest pains,sob, palpitation or other significant symptoms. cabg at [facility]-8/2016 1/2020  Patient is seen today for cardiac evaluation. His increased shortness of breath. He was noted to have a right pleural effusion which was tapped at [facility].  Shortness of breath is improved since then he denies any chest pain at present. 2/2020  Patient is here for follow up of diagnostic tests. Results will be discussed. Hypertension   The history is provided by the patient. This is a chronic problem. The problem occurs constantly. The problem has not changed since onset. Associated symptoms include shortness of breath. Pertinent negatives include no chest pain. Review of Systems   Constitutional: Negative for chills and fever. HENT: Negative for nosebleeds. Eyes: Negative for blurred vision and double vision. Respiratory: Positive for shortness of breath. Negative for cough, hemoptysis, sputum production and wheezing. Cardiovascular: Negative for chest pain, palpitations, orthopnea, claudication, leg swelling and PND. Gastrointestinal: Negative for heartburn, nausea and vomiting. Musculoskeletal: Negative for myalgias. Skin: Negative for rash. Neurological: Negative for dizziness and weakness. Endo/Heme/Allergies: Does not bruise/bleed easily.      Family History   Problem Relation Age of Onset    Hypertension Other        Past Medical History:   Diagnosis Date    Anemia NEC     Anemia in chronic kidney disease     BPH (benign prostatic hypertrophy)     Chronic kidney disease, unspecified     On dialysis    Coronary atherosclerosis of native coronary artery     Stable, no angina    ESRD on dialysis ([facility] [address] 5/18/10    Essential hypertension, benign     Elevated today, on multiple meds    Gout     Hypercholesterolemia     Hyperlipidemia     Mitral valve insufficiency and aortic valve insufficiency     Mod mr, mild ai    Osteomalacia, unspecified     Other and unspecified hyperlipidemia     On Lipitor. LDL less than 60    Other chronic pulmonary heart diseases     PAP 50 MMHG    Pre-operative cardiovascular examination     For renal transplant    Pre-transplant evaluation for end stage renal disease     Renal mass     Secondary hyperparathyroidism (of renal origin)     Unspecified essential hypertension        Past Surgical History:   Procedure Laterality Date    HX KNEE ARTHROSCOPY      HX ORTHOPAEDIC      HX OTHER SURGICAL      Hx dialysis catheter     HX OTHER SURGICAL      Renal shunt    VASCULAR SURGERY PROCEDURE UNLIST  5/2010       No Known Allergies    Current Outpatient Medications   Medication Sig    loratadine (CLARITIN) 10 mg tablet Take 10 mg by mouth.  carvedilol (COREG) 25 mg tablet Take 25 mg by mouth two (2) times daily (with meals).  doxycycline (ADOXA) 100 mg tablet Take 100 mg by mouth two (2) times a day.  lisinopril (PRINIVIL, ZESTRIL) 5 mg tablet Take 1 Tab by mouth daily.  atorvastatin (LIPITOR) 20 mg tablet Take 1 Tab by mouth daily.  cholecalciferol (VITAMIN D3) 400 unit tab tablet Take  by mouth two (2) times a day.  aspirin 81 mg CpDR Take  by mouth.  cinacalcet (SENSIPAR) 30 mg tablet Take 60 mg by mouth daily.  sevelamer carbonate (RENVELA) 800 mg Tab tab Take  by mouth three (3) times daily. No current facility-administered medications for this visit. Visit Vitals  /68 (BP 1 Location: Left arm, BP Patient Position: Sitting)   Pulse 79   Temp 95.9 °F (35.5 °C) (Oral)   Ht 5' 6\" (1.676 m)   Wt 76.4 kg (168 lb 6.4 oz)   SpO2 100%   BMI 27.18 kg/m²         Physical Exam   Constitutional: He is oriented to person, place, and time. He appears well-developed and well-nourished. HENT:   Head: Normocephalic and atraumatic. Eyes: Conjunctivae are normal.   Neck: Neck supple.  No JVD present. No tracheal deviation present. No thyromegaly present. Cardiovascular: Normal rate and regular rhythm. Exam reveals no gallop and no friction rub. Murmur heard. Holosystolic murmur is present with a grade of 2/6 at the lower left sternal border. Early systolic murmur is also present at the upper right sternal border and lower left sternal border. Pulmonary/Chest: Breath sounds normal. No respiratory distress. He has no wheezes. He has no rales. He exhibits no tenderness. Abdominal: Soft. There is no abdominal tenderness. Musculoskeletal:         General: No edema. Neurological: He is alert and oriented to person, place, and time. Skin: Skin is warm and dry. Psychiatric: He has a normal mood and affect. No flowsheet data found. CORONARY ANGIOGRAPHY:cath:2010  Left Main Coronary Artery: The left main coronary artery is somewhat irregular and, like all of the coronaries, is somewhat aneurysmally dilated. The left main divides into the left anterior descending and circumflex branches in typical fashion. Left Anterior Descending Coronary Artery: The left anterior descending coronary artery gives rise to a very large first diagonal vessel. This vessel runs parallel to the left anterior descending over the anterolateral surface of the heart. The diagonal bifurcates terminally. The left anterior descending in the mid segment beyond the takeoff of the diagonal has a long area of 80% narrowing. Distally, the left anterior descending is a normal-caliber vessel and courses to the apex. The proximal portion of the left anterior descending at the site of the diagonal takeoff is aneurysmally dilated. Left Circumflex Coronary Artery: The left circumflex coronary artery consists of a total of 4 marginal vessels. The first is occluded with faint filling of the ghost distal vessel. The second is small and free of significant narrowing. The third has about 40% stenosis proximally but is small. A fourth is small and free of disease. The fifth marginal has some 30-40% narrowing at its origin. The entire AV groove portion of the circumflex is aneurysmally dilated and irregular. Right Coronary Artery: The right coronary artery is dominant, giving rise to a small posterior descending branch and a posterior left ventricular branch. There is serial 20% stenosis in the proximal right with aneurysmal dilatation of the proximal right. The posterior descending is a small vessel and has a 90% stenosis distally. Beyond the stenosis, the vessel was extremely small and not suitable for coronary intervention or coronary artery bypass grafting. The posterior left ventricular branch of the right is totally occluded proximally and also is a tiny vessel. FINAL DIAGNOSES:  1. NORMAL LEFT VENTRICULAR FUNCTION. 2. THREE-VESSEL CORONARY ARTERY DISEASE. 3. ANEURYSMALLY DILATED CORONARY ARTERIES THAT ARE IRREGULAR AND ECTATIC. RECOMMENDATION: Mr. Poncho Hui will have his risk factors optimized; specifically, he will be placed on aspirin and be certain that his lipid profile reflects a target LDL of less than 70. He will need functional testing to assess the possibility of the need for coronary intervention to the left anterior descending. He does have disease involving the first marginal, which is occluded; the posterolateral system of the right, which is occluded; and the posterior descending branch of the right, which has a high-grade stenosis but is a tiny vessel. However, the obtuse marginal, the posterolateral, and the posterior descending vessels are all exceedingly small and not suitable for coronary intervention or coronary artery bypass grafting. Ek2016  Sinus  Rhythm   -Left bundle branch block and left axis. ABNORMAL     SUMMARY:echo:2016  Left ventricle: Systolic function was normal. Ejection fraction was  estimated to be 55 %.  No obvious wall motion abnormalities identified in  the views obtained. There was mild concentric hypertrophy. Doppler  parameters were consistent with abnormal left ventricular relaxation  (grade 1 diastolic dysfunction). Left atrium: The atrium was dilated. Mitral valve: There was trivial regurgitation. Pulmonic valve: Transpulmonic velocity was increased due to increased  transvalvular flow. There was mild regurgitation. 8/2016  cabg  sinle vg to lad    [facility]-results pending  1/2020- nuclear stress test at [facility]  Inferior inferoapical ischemia. Wall motion abnormality with 34% ejection fraction  Interpretation Summary 2/2020    · Normal cavity size. Mild concentric hypertrophy. Moderate systolic dysfunction. Estimated left ventricular ejection fraction is 35 - 40%. Left ventricular global hypokinesis. Moderate (grade 2) left ventricular diastolic dysfunction. · Moderately dilated right ventricle. · Moderately dilated right atrium. · Aortic valve leaflet calcification present. Aortic valve mean gradient is 10.9 mmHg. Mild aortic valve stenosis is present. · Mitral valve thickening. Mild mitral valve regurgitation is present. · Mild tricuspid valve regurgitation is present. Pulmonary arterial systolic pressure is 50.0 mmHg. Moderate pulmonary hypertension. · Mild pulmonic valve regurgitation is present. Assessment       ICD-10-CM ICD-9-CM    1. Atherosclerosis of native coronary artery of native heart with other form of angina pectoris ([facility] [address] Y20.746 317.40 METABOLIC PANEL, BASIC     413.9 PROTHROMBIN TIME + INR      CBC WITH AUTOMATED DIFF      WI CATH PLACEMENT & NJX CORONARY ART ANGIO IMG S&I    Abnormal nuclear stress test with inferoapical ischemia. With wall motion abnormality. 2. Mitral valve insufficiency and aortic valve insufficiency B37.3 093.5 METABOLIC PANEL, BASIC      PROTHROMBIN TIME + INR      CBC WITH AUTOMATED DIFF      WI CATH PLACEMENT & NJX CORONARY ART ANGIO IMG S&I    Stable.   Trivial. 3. Essential hypertension, benign I10 401.1     Stable monitor   4. ESRD on dialysis ([facility] [address] N18.6 585.6     Z99.2 V45.11     Stable   5. S/P CABG x 1 Z95.1 V45.81    6. Cardiomyopathy, unspecified type ([facility] [address] I42.9 425.4     Worsening ejection fraction. Adjust medication. Abnormal nuclear scan will proceed with cardiac cath to evaluate further       There are no discontinued medications. 1/2020  Patient referred back from [place]. Recent nuclear stress test shows ischemia in inferior septal and apical area with some areas of fixed defect . anterior wall was not reported to have any defect. This likely indicates that vein graft to LAD is patent. Patient had diffuse disease in other areas so likely not surprising that he has defect in other area. His ejection fraction was reported at 34%. His echo from 8/2019 showed 40 to 45% ejection fraction. He had moderate pulmonary hypertension and aortic valve disease. We will follow-up echo to see the ejection fraction if it has dropped further. I have added lisinopril he is already on Coreg  2/2020  Continue current medication. LV ejection fraction is reduced. With abnormal stress test prior CABG and history of diffuse disease in other area will proceed further with evaluation by cardiac catheterization. THE PATIENT UNDERSTANDS THAT ALTHOUGH RARE, SEVERE  UNEXPECTED COMPLICATIONS CAN OCCUR WITH EACH TYPE OF CARDIAC CATH PROCEDURE.   THESE RISKS INCLUDE,  BUT ARE NOT LIMITED TO: ALLERGIC REACTION, INFECTION, BLEEDING, BLOOD VESSEL INJURY,   KIDNEY INJURY FROM X-RAY DYE, PUNCTURE OF THE HEART/LUNGS,   EMERGENT OPEN HEART SURGERY, HEART ATTACK, STROKE, CARDIAC  ARREST OR DEATH OR NEED FOR EMERGENCY CARDIAC BYPASS SURGERY       Orders Placed This Encounter    METABOLIC PANEL, BASIC     Standing Status:   Future     Standing Expiration Date:   3/20/2020    PROTHROMBIN TIME + INR     Standing Status:   Future     Standing Expiration Date:   3/20/2020    CBC WITH AUTOMATED DIFF     Standing Status:   Future     Standing Expiration Date:   3/20/2020    NM CATH PLACEMENT & NJX CORONARY ART ANGIO IMG S&I       Follow-up and Dispositions    · Return in about 4 weeks (around 3/18/2020).

## 2020-03-03 ENCOUNTER — HOSPITAL ENCOUNTER (OUTPATIENT)
Age: 75
Setting detail: OBSERVATION
Discharge: HOME | End: 2020-03-04
Attending: INTERNAL MEDICINE | Admitting: INTERNAL MEDICINE
Payer: MEDICARE

## 2020-03-03 DIAGNOSIS — I25.118 CORONARY ARTERY DISEASE WITH STABLE ANGINA PECTORIS (HCC): ICD-10-CM

## 2020-03-03 PROBLEM — I51.89 DIASTOLIC DYSFUNCTION: Status: ACTIVE | Noted: 2020-03-03

## 2020-03-03 PROBLEM — I25.10 CAD (CORONARY ARTERY DISEASE): Status: ACTIVE | Noted: 2020-03-03

## 2020-03-03 LAB
ANION GAP SERPL CALC-SCNC: 5 MMOL/L (ref 3–18)
BASOPHILS # BLD: 0 K/UL (ref 0–0.1)
BASOPHILS NFR BLD: 1 % (ref 0–2)
BUN SERPL-MCNC: 41 MG/DL (ref 7–18)
BUN/CREAT SERPL: 4 (ref 12–20)
CALCIUM SERPL-MCNC: 9.8 MG/DL (ref 8.5–10.1)
CHLORIDE SERPL-SCNC: 103 MMOL/L (ref 100–111)
CO2 SERPL-SCNC: 32 MMOL/L (ref 21–32)
CRD SYSTOLIC BP: 132
CREAT SERPL-MCNC: 9.25 MG/DL (ref 0.6–1.3)
DIFFERENTIAL METHOD BLD: ABNORMAL
EOSINOPHIL # BLD: 0.2 K/UL (ref 0–0.4)
EOSINOPHIL NFR BLD: 3 % (ref 0–5)
ERYTHROCYTE [DISTWIDTH] IN BLOOD BY AUTOMATED COUNT: 13.6 % (ref 11.6–14.5)
GLUCOSE SERPL-MCNC: 86 MG/DL (ref 74–99)
HCT VFR BLD AUTO: 32.6 % (ref 36–48)
HGB BLD-MCNC: 10.2 G/DL (ref 13–16)
INR PPP: 1.1 (ref 0.8–1.2)
LYMPHOCYTES # BLD: 1.5 K/UL (ref 0.9–3.6)
LYMPHOCYTES NFR BLD: 24 % (ref 21–52)
MCH RBC QN AUTO: 30.1 PG (ref 24–34)
MCHC RBC AUTO-ENTMCNC: 31.3 G/DL (ref 31–37)
MCV RBC AUTO: 96.2 FL (ref 74–97)
MONOCYTES # BLD: 0.4 K/UL (ref 0.05–1.2)
MONOCYTES NFR BLD: 7 % (ref 3–10)
NEUTS SEG # BLD: 4.3 K/UL (ref 1.8–8)
NEUTS SEG NFR BLD: 65 % (ref 40–73)
PLATELET # BLD AUTO: 224 K/UL (ref 135–420)
PMV BLD AUTO: 11.2 FL (ref 9.2–11.8)
POTASSIUM SERPL-SCNC: 5 MMOL/L (ref 3.5–5.5)
PROTHROMBIN TIME: 13.9 SEC (ref 11.5–15.2)
RBC # BLD AUTO: 3.39 M/UL (ref 4.7–5.5)
SODIUM SERPL-SCNC: 140 MMOL/L (ref 136–145)
WBC # BLD AUTO: 6.4 K/UL (ref 4.6–13.2)

## 2020-03-03 PROCEDURE — 77030016699 HC CATH ANGI DX INFN1 CARD -A: Performed by: INTERNAL MEDICINE

## 2020-03-03 PROCEDURE — 80048 BASIC METABOLIC PNL TOTAL CA: CPT

## 2020-03-03 PROCEDURE — 99218 HC RM OBSERVATION: CPT

## 2020-03-03 PROCEDURE — 77030013797 HC KT TRNSDUC PRSSR EDWD -A: Performed by: INTERNAL MEDICINE

## 2020-03-03 PROCEDURE — C1894 INTRO/SHEATH, NON-LASER: HCPCS | Performed by: INTERNAL MEDICINE

## 2020-03-03 PROCEDURE — 93458 L HRT ARTERY/VENTRICLE ANGIO: CPT | Performed by: INTERNAL MEDICINE

## 2020-03-03 PROCEDURE — 85610 PROTHROMBIN TIME: CPT

## 2020-03-03 PROCEDURE — 99153 MOD SED SAME PHYS/QHP EA: CPT | Performed by: INTERNAL MEDICINE

## 2020-03-03 PROCEDURE — 74011250637 HC RX REV CODE- 250/637: Performed by: INTERNAL MEDICINE

## 2020-03-03 PROCEDURE — 99152 MOD SED SAME PHYS/QHP 5/>YRS: CPT | Performed by: INTERNAL MEDICINE

## 2020-03-03 PROCEDURE — 74011636320 HC RX REV CODE- 636/320: Performed by: INTERNAL MEDICINE

## 2020-03-03 PROCEDURE — 74011250637 HC RX REV CODE- 250/637

## 2020-03-03 PROCEDURE — 74011000250 HC RX REV CODE- 250: Performed by: INTERNAL MEDICINE

## 2020-03-03 PROCEDURE — 74011250636 HC RX REV CODE- 250/636: Performed by: INTERNAL MEDICINE

## 2020-03-03 PROCEDURE — 85025 COMPLETE CBC W/AUTO DIFF WBC: CPT

## 2020-03-03 PROCEDURE — 77030012468 HC VLV BLEEDBK CNTRL ABBT -B: Performed by: INTERNAL MEDICINE

## 2020-03-03 PROCEDURE — C1769 GUIDE WIRE: HCPCS | Performed by: INTERNAL MEDICINE

## 2020-03-03 RX ORDER — GUAIFENESIN 100 MG/5ML
LIQUID (ML) ORAL
Status: COMPLETED
Start: 2020-03-03 | End: 2020-03-03

## 2020-03-03 RX ORDER — DOXERCALCIFEROL 4 UG/2ML
1 INJECTION INTRAVENOUS
Status: DISCONTINUED | OUTPATIENT
Start: 2020-03-04 | End: 2020-03-04 | Stop reason: HOSPADM

## 2020-03-03 RX ORDER — SEVELAMER CARBONATE 800 MG/1
800 TABLET, FILM COATED ORAL
Status: DISCONTINUED | OUTPATIENT
Start: 2020-03-03 | End: 2020-03-04 | Stop reason: HOSPADM

## 2020-03-03 RX ORDER — ATORVASTATIN CALCIUM 20 MG/1
20 TABLET, FILM COATED ORAL DAILY
Status: DISCONTINUED | OUTPATIENT
Start: 2020-03-04 | End: 2020-03-04 | Stop reason: HOSPADM

## 2020-03-03 RX ORDER — SODIUM CHLORIDE 0.9 % (FLUSH) 0.9 %
5-40 SYRINGE (ML) INJECTION AS NEEDED
Status: DISCONTINUED | OUTPATIENT
Start: 2020-03-03 | End: 2020-03-04 | Stop reason: HOSPADM

## 2020-03-03 RX ORDER — ASPIRIN 81 MG/1
162 TABLET ORAL DAILY
Status: DISCONTINUED | OUTPATIENT
Start: 2020-03-04 | End: 2020-03-04 | Stop reason: HOSPADM

## 2020-03-03 RX ORDER — CARVEDILOL 25 MG/1
25 TABLET ORAL 2 TIMES DAILY WITH MEALS
Status: DISCONTINUED | OUTPATIENT
Start: 2020-03-03 | End: 2020-03-04 | Stop reason: HOSPADM

## 2020-03-03 RX ORDER — TEMAZEPAM 15 MG/1
15 CAPSULE ORAL
Status: DISCONTINUED | OUTPATIENT
Start: 2020-03-03 | End: 2020-03-04 | Stop reason: HOSPADM

## 2020-03-03 RX ORDER — SODIUM CHLORIDE 0.9 % (FLUSH) 0.9 %
5-40 SYRINGE (ML) INJECTION EVERY 8 HOURS
Status: DISCONTINUED | OUTPATIENT
Start: 2020-03-03 | End: 2020-03-04 | Stop reason: HOSPADM

## 2020-03-03 RX ORDER — LISINOPRIL 5 MG/1
5 TABLET ORAL DAILY
Status: DISCONTINUED | OUTPATIENT
Start: 2020-03-04 | End: 2020-03-04 | Stop reason: HOSPADM

## 2020-03-03 RX ORDER — LIDOCAINE HYDROCHLORIDE 10 MG/ML
INJECTION, SOLUTION EPIDURAL; INFILTRATION; INTRACAUDAL; PERINEURAL AS NEEDED
Status: DISCONTINUED | OUTPATIENT
Start: 2020-03-03 | End: 2020-03-03 | Stop reason: HOSPADM

## 2020-03-03 RX ORDER — ACETAMINOPHEN 325 MG/1
650 TABLET ORAL
Status: DISCONTINUED | OUTPATIENT
Start: 2020-03-03 | End: 2020-03-04 | Stop reason: HOSPADM

## 2020-03-03 RX ORDER — GUAIFENESIN 100 MG/5ML
81 LIQUID (ML) ORAL ONCE
Status: COMPLETED | OUTPATIENT
Start: 2020-03-03 | End: 2020-03-03

## 2020-03-03 RX ORDER — CINACALCET 30 MG/1
60 TABLET, FILM COATED ORAL DAILY
Status: DISCONTINUED | OUTPATIENT
Start: 2020-03-04 | End: 2020-03-04

## 2020-03-03 RX ORDER — CYANOCOBALAMIN (VITAMIN B-12) 500 MCG
1000 TABLET ORAL DAILY
Status: DISCONTINUED | OUTPATIENT
Start: 2020-03-04 | End: 2020-03-04 | Stop reason: HOSPADM

## 2020-03-03 RX ORDER — MIDAZOLAM HYDROCHLORIDE 1 MG/ML
INJECTION, SOLUTION INTRAMUSCULAR; INTRAVENOUS AS NEEDED
Status: DISCONTINUED | OUTPATIENT
Start: 2020-03-03 | End: 2020-03-03 | Stop reason: HOSPADM

## 2020-03-03 RX ORDER — FENTANYL CITRATE 50 UG/ML
INJECTION, SOLUTION INTRAMUSCULAR; INTRAVENOUS AS NEEDED
Status: DISCONTINUED | OUTPATIENT
Start: 2020-03-03 | End: 2020-03-03 | Stop reason: HOSPADM

## 2020-03-03 RX ORDER — HYDRALAZINE HYDROCHLORIDE 20 MG/ML
20 INJECTION INTRAMUSCULAR; INTRAVENOUS
Status: DISCONTINUED | OUTPATIENT
Start: 2020-03-03 | End: 2020-03-04 | Stop reason: HOSPADM

## 2020-03-03 RX ORDER — NITROGLYCERIN 400 UG/1
1 SPRAY ORAL
Status: DISCONTINUED | OUTPATIENT
Start: 2020-03-03 | End: 2020-03-04 | Stop reason: HOSPADM

## 2020-03-03 RX ADMIN — Medication 81 MG: at 09:45

## 2020-03-03 RX ADMIN — CARVEDILOL 25 MG: 12.5 TABLET, FILM COATED ORAL at 17:00

## 2020-03-03 RX ADMIN — ASPIRIN 81 MG 81 MG: 81 TABLET ORAL at 09:45

## 2020-03-03 RX ADMIN — Medication 10 ML: at 21:17

## 2020-03-03 NOTE — INTERVAL H&P NOTE
H&P Update:  Karen Goff was seen and examined. History and physical has been reviewed. The patient has been examined. There have been no significant clinical changes since the completion of the originally dated History and Physical.  Discussed with patient and wife again, the procedure. All questions answered.  Patient is willing to proceed

## 2020-03-03 NOTE — ROUTINE PROCESS
TRANSFER - IN REPORT:    Verbal report received from  Via Bertha Vasquez RN(name) on [employer].  being received from Cath lab(unit) for routine progression of care      Report consisted of patients Situation, Background, Assessment and   Recommendations(SBAR). Information from the following report(s) SBAR, Kardex, Procedure Summary and Med Rec Status was reviewed with the receiving nurse. Opportunity for questions and clarification was provided. Assessment completed upon patients arrival to unit and care assumed.

## 2020-03-03 NOTE — PROGRESS NOTES
4 FR sheath pulled from R groin. Pt tolerated well. No bleeding or hematoma. Vitals stable. No c/o pain. Quikclot and Tegaderm in place. Dressing clean, dry and intact.

## 2020-03-03 NOTE — PROGRESS NOTES
TRANSFER - OUT REPORT:    Verbal report given to Huma(name) on [facility].  being transferred to cath lab(unit) for ordered procedure       Report consisted of patients Situation, Background, Assessment and   Recommendations(SBAR). Information from the following report(s) SBAR, Intake/Output, MAR and Recent Results was reviewed with the receiving nurse. Lines:       Opportunity for questions and clarification was provided.       Patient transported with:   [employer]

## 2020-03-03 NOTE — CONSULTS
[facility]  -  Consult      3/3/2020    Carly Moss is a 76 y.o. male who is being seen on consult for CAD/CABG eval, at  Dr. Brown Loop request.      Assessment:   · 3VCAD - CABG 2016  · EF 35 to 40%  · Hypertension  · Hyperlipidemia  · Diastolic dysfunction grade 2  · Mild MR, Mild AS, Mod pHTN  · ESRD on HD - MWF    Patient Active Problem List    Diagnosis Date Noted    Pleural effusion on right 01/09/2020    SOB (shortness of breath) on exertion 01/09/2020    Cardiomyopathy ([facility] [address] 01/09/2020    Nonrheumatic aortic valve stenosis 01/09/2020    S/P CABG x 1 11/17/2016    Chronic kidney disease, unspecified     Essential hypertension, benign     Hypercholesteremia     Coronary atherosclerosis of native coronary artery     Pre-operative cardiovascular examination     Hypercholesterolemia     Mitral valve insufficiency and aortic valve insufficiency     Other chronic pulmonary heart diseases     ESRD on dialysis ([facility] [address] 05/15/2012    Pre-transplant evaluation for end stage renal disease 05/15/2012    Essential hypertension 05/15/2012    Anemia in chronic kidney disease(285.21) 05/15/2012    Secondary hyperparathyroidism (of renal origin) 05/15/2012    Hyperlipidemia 05/15/2012    Iron deficiency anemia, unspecified 05/15/2012    BPH (benign prostatic hypertrophy) 05/15/2012    Gout 05/15/2012    Osteomalacia, unspecified 05/15/2012     Cardiac risk factors: Dyslipidemia, hypertension    Plan:     1. Patient is unsure if he wants to pursue CABG here at this time. 2.  Our office information provided to patient       Subjective:   SOB    History of Present Illness:   Carly Moss is a 76 y.o. male PMH known CAD s/p CABG (SVG-LAD) 2016, HTN, ESRD on HD, HTN, Anemia of chronic disease,   presented with increasing shortness of breath, he denies any chest pain or chest pressure with exertion, eating or rest pain.   He denies chest pain with exertion, eating or rest pain   he is followed at the [facility].  He recently had a thoracentesis on the right. The [place] records are not available to review. He had a thoracentesis in January 2020 for right effusion. The CT scan reports perhaps a chronic effusion? Cardiac risk factors include dyslipidemia, hypertension. Past Medical History:     Past Medical History:   Diagnosis Date    Anemia NEC     Anemia in chronic kidney disease     BPH (benign prostatic hypertrophy)     Chronic kidney disease, unspecified     On dialysis    Coronary atherosclerosis of native coronary artery     Stable, no angina    ESRD on dialysis ([facility] [address] 5/18/10    Essential hypertension, benign     Elevated today, on multiple meds    Gout     Hypercholesterolemia     Hyperlipidemia     Mitral valve insufficiency and aortic valve insufficiency     Mod mr, mild ai    Osteomalacia, unspecified     Other and unspecified hyperlipidemia     On Lipitor. LDL less than 60    Other chronic pulmonary heart diseases     PAP 50 MMHG    Pre-operative cardiovascular examination     For renal transplant    Pre-transplant evaluation for end stage renal disease     Renal mass     Secondary hyperparathyroidism (of renal origin)     Unspecified essential hypertension        Past Surgical History:     Past Surgical History:   Procedure Laterality Date    HX KNEE ARTHROSCOPY      HX ORTHOPAEDIC      HX OTHER SURGICAL      Hx dialysis catheter     HX OTHER SURGICAL      Renal shunt    VASCULAR SURGERY PROCEDURE UNLIST  5/2010       Social History:   He  reports that he has never smoked. He has never used smokeless tobacco.  He  reports current alcohol use.     Family History:     Family History   Problem Relation Age of Onset    Hypertension Other      Allergies and Intolerances:   No Known Allergies    Home Medications:     Present medications include   Current Facility-Administered Medications   Medication Dose Route Frequency Provider Last Rate Last Dose    [START ON 3/4/2020] aspirin delayed-release tablet 162 mg  162 mg Oral DAILY Srinivas Quick MD        [START ON 3/4/2020] atorvastatin (LIPITOR) tablet 20 mg  20 mg Oral DAILY Srinivas Quick MD        carvediloL (COREG) tablet 25 mg  25 mg Oral BID WITH MEALS Srinivas Quick MD        [START ON 3/4/2020] cholecalciferol (VITAMIN D3) (400 Units /10 mcg) tablet 2.5 Tab  1,000 Units Oral DAILY Srinivas Quick MD        [START ON 3/4/2020] cinacalcet tab 60 mg  60 mg Oral DAILY Srinivas Quick MD        [START ON 3/4/2020] lisinopril (PRINIVIL, ZESTRIL) tablet 5 mg  5 mg Oral DAILY Srinivas Quick MD        sevelamer carbonate (RENVELA) tab 800 mg  800 mg Oral TID WITH MEALS Srinivas Quick MD             Review of Systems:   He denies significant weight gain or weight loss recently, fevers, chills, a productive cough, jaundice, easy bruisability, headaches, temporary blindness, diplopia, hearing loss, draining sinuses, chronic back pain, recent extremity fractures, abdominal pain, constipation, diarrhea, flank pain, dysuria, bulging leg veins, deep venous thrombosis, or calf pain with exertion. Physical Examination:     Visit Vitals  /80 (BP 1 Location: Right arm, BP Patient Position: At rest)   Pulse 68   Resp 18   Wt 75 kg (165 lb 5.5 oz)   SpO2 99%   BMI 26.69 kg/m²     PHYSICAL EXAMINATION:  Vital signs:   BP Readings from Last 3 Encounters:   03/03/20 137/80   02/19/20 130/68   02/12/20 140/75     No data recorded.     Wt Readings from Last 3 Encounters:   03/03/20 75 kg (165 lb 5.5 oz)   02/19/20 76.4 kg (168 lb 6.4 oz)   02/12/20 74.8 kg (165 lb)     Ht Readings from Last 3 Encounters:   02/19/20 5' 6\" (1.676 m)   02/12/20 5' 6\" (1.676 m)   01/09/20 5' 6\" (1.676 m)       General:   awake alert and oriented   Skin:   no rashes or skin lesions noted on limited exam   HEENT:  Normocephalic, atraumatic, PERRL, EOMI, no scleral icterus or pallor; no conjunctival hemmohage;  nasal and oral mucous are moist and without evidence of lesions. No thrush. Dentition good. Neck supple, no bruits. Lymph Nodes:   no cervical, axillary or inguinal adenopathy   Lungs:   non-labored, bilaterally clear to aspiration- no crackles wheezes rales or rhonchi   Heart:  RRR, s1 and s2; + 2/6 DEBORAH @ LSB, no rub or gallops, no edema, + pedal pulses   Abdomen:  soft, non-distended, active bowel sounds, no hepatomegaly, no splenomegaly. Appropriate surgical scars for stated surgeries. Non-tender   Genitourinary:  deferred   Extremities:    + L arm fistula +thrill, well-healed EVH scar on right LE.  no clubbing, cyanosis; no joint effusions or swelling; Full ROM of all large joints to the upper and lower extremities; muscle mass appropriate for age   Neurologic:  No gross focal sensory abnormalities; 5/5 muscle strength to upper and lower extremities. Speech appropirate. Cranial nerves intact   Psychiatric:   appropriate and interactive. Laboratory Data:     Lab Results   Component Value Date/Time    WBC 6.4 03/03/2020 10:20 AM    HGB 10.2 (L) 03/03/2020 10:20 AM    HCT 32.6 (L) 03/03/2020 10:20 AM    PLATELET 300 69/66/1415 10:20 AM     Lab Results   Component Value Date/Time    Sodium 140 03/03/2020 10:20 AM    Potassium 5.0 03/03/2020 10:20 AM    Chloride 103 03/03/2020 10:20 AM    CO2 32 03/03/2020 10:20 AM    Glucose 86 03/03/2020 10:20 AM    BUN 41 (H) 03/03/2020 10:20 AM    Creatinine 9.25 (H) 03/03/2020 10:20 AM     Lab Results   Component Value Date/Time    Cholesterol, total 97 05/03/2010 03:40 PM    HDL Cholesterol 29 (L) 05/03/2010 03:40 PM    LDL, calculated 54.2 05/03/2010 03:40 PM    Triglyceride 69 05/03/2010 03:40 PM       Recent Labs     03/03/20  1020   CA 9.8       EKG: (independently reviewed)   02/12/20   ECHO ADULT COMPLETE 02/12/2020 2/12/2020    Narrative · Normal cavity size. Mild concentric hypertrophy. Moderate systolic   dysfunction.  Estimated left ventricular ejection fraction is 35 - 40%. Left ventricular global hypokinesis. Moderate (grade 2) left ventricular   diastolic dysfunction. · Moderately dilated right ventricle. · Moderately dilated right atrium. · Aortic valve leaflet calcification present. Aortic valve mean gradient   is 10.9 mmHg. Mild aortic valve stenosis is present. · Mitral valve thickening. Mild mitral valve regurgitation is present. · Mild tricuspid valve regurgitation is present. Pulmonary arterial   systolic pressure is 22.8 mmHg. Moderate pulmonary hypertension. · Mild pulmonic valve regurgitation is present. Signed by: Travis Severino MD       CATHETERIZATION:   03/03/20   CARDIAC PROCEDURE 03/03/2020 3/3/2020    Narrative · Severe three-vessel coronary disease with 90% proximal LAD, 90% mid LAD,   95% proximal circumflex, 100% OM1 with left to left collaterals, 100% mid   RCA with left to right collaterals opacifying right PDA and right   posterolateral branch. · Mild LV dysfunction with ejection fraction in the range of 40 to 45%   with diffuse hypokinesis and more hypokinesis in the posterior basal   segment. · Elevated LV end-diastolic pressure 21 mmHg. · No significant aortic gradient at pullback-5 to 8 mm peak gradient. · Presumed totally occluded venous graft to LAD despite multiple catheters   and absence of competitive flow during native injections. · 4 Romanian catheters and right groin approach. · Left subclavian angiography with absence of any gradient between the   aorta and left subclavian artery and good-sized proximal left internal   mammary artery. Patient has lost his only venous graft to LAD. He has severe native   three-vessel disease with heavily calcified vessels. A bypass surgery would be best option for him if it can be performed with   reasonable risk. I took a picture of subclavian artery on the left side and the proximal   part of the LIMA appears to be good-sized vessel.   We will consult our cardiac surgery department.         Signed by: Peter Obando MD       STS RISK:   STS Adult Cardiac Surgery Database Version 2.9  RISK SCORES  Procedure: Isolated CAB  Risk of Mortality:  7.708%  Renal Failure:  NA  Permanent Stroke:  1.015%  Prolonged Ventilation:  17.986%  DSW Infection:  0.206%  Reoperation:  4.672%  Morbidity or Mortality:  23.834%  Short Length of Stay:  18.858%  Long Length of Stay:  12.890%    Ivy Saucedo PA-C  [facility]  [phone]

## 2020-03-03 NOTE — PROGRESS NOTES
Pt resting comfortably. No distress. No bleeding or hematoma to R groin. Dressing clean, dry and intact.

## 2020-03-03 NOTE — PROGRESS NOTES
TRANSFER - IN REPORT:    Verbal report received from Kaylene(name) on Sera Johnson.  being received from cath lab(unit) for routine post - op      Report consisted of patients Situation, Background, Assessment and   Recommendations(SBAR). Information from the following report(s) SBAR, Procedure Summary and MAR was reviewed with the receiving nurse. Opportunity for questions and clarification was provided. Assessment completed upon patients arrival to unit and care assumed. 4 Fr sheath in place to R groin.

## 2020-03-03 NOTE — PROGRESS NOTES
Pt in no distress. Pt wanting to go home to have the surgery done at the [place]. Dr. Tomy Parker notified.  Family at bedside

## 2020-03-03 NOTE — PROGRESS NOTES
All events noted,Needs CABG. Patient still not sure if he will proceed with surgery or not, if he stays then will arrange Dialysis tomorrow. Natalia Larson

## 2020-03-03 NOTE — Clinical Note
Bilateral groin prepped with ChloraPrep and draped. Wet prep solution applied at: 1100. Wet prep solution dried at: 1103. Wet prep elapsed drying time: 3 mins.

## 2020-03-03 NOTE — Clinical Note
TRANSFER - OUT REPORT:     Verbal report given to: Bisi Huntley RN. Report consisted of patient's Situation, Background, Assessment and   Recommendations(SBAR). Opportunity for questions and clarification was provided. Patient transported with a Cardiac Cath Tech / Patient Care Tech. Patient transported to: [address]

## 2020-03-03 NOTE — PROGRESS NOTES
Cath holding summary     Patient escorted to cath holding from waiting area ambulatory, alert and oriented x 4, voicing no complaints. Changed into gown and placed on monitor. NPO since MN. Lab results, med rec and H&P reviewed on chart. PIV x 2 inserted without difficulty. Groin and R wrist prepped. Family to bedside.

## 2020-03-03 NOTE — Clinical Note
TRANSFER - IN REPORT:     Verbal report received from: Dov Hobson RN. Report consisted of patient's Situation, Background, Assessment and   Recommendations(SBAR). Opportunity for questions and clarification was provided. Assessment completed upon patient's arrival to unit and care assumed. Patient transported with a Cardiac Cath Tech / Patient Care Tech.

## 2020-03-04 VITALS
HEART RATE: 79 BPM | OXYGEN SATURATION: 97 % | DIASTOLIC BLOOD PRESSURE: 55 MMHG | BODY MASS INDEX: 27.16 KG/M2 | SYSTOLIC BLOOD PRESSURE: 116 MMHG | RESPIRATION RATE: 18 BRPM | TEMPERATURE: 97.5 F | WEIGHT: 168.3 LBS

## 2020-03-04 PROBLEM — E87.5 HYPERKALEMIA: Status: ACTIVE | Noted: 2020-03-04

## 2020-03-04 LAB
ALBUMIN SERPL-MCNC: 3.7 G/DL (ref 3.4–5)
ALBUMIN/GLOB SERPL: 0.9 {RATIO} (ref 0.8–1.7)
ALP SERPL-CCNC: 66 U/L (ref 45–117)
ALT SERPL-CCNC: 12 U/L (ref 16–61)
ANION GAP SERPL CALC-SCNC: 4 MMOL/L (ref 3–18)
AST SERPL-CCNC: 12 U/L (ref 10–38)
BASOPHILS # BLD: 0 K/UL (ref 0–0.1)
BASOPHILS NFR BLD: 1 % (ref 0–2)
BILIRUB SERPL-MCNC: 0.8 MG/DL (ref 0.2–1)
BUN SERPL-MCNC: 50 MG/DL (ref 7–18)
BUN/CREAT SERPL: 5 (ref 12–20)
CALCIUM SERPL-MCNC: 9.3 MG/DL (ref 8.5–10.1)
CALCIUM SERPL-MCNC: 9.5 MG/DL (ref 8.5–10.1)
CHLORIDE SERPL-SCNC: 101 MMOL/L (ref 100–111)
CO2 SERPL-SCNC: 28 MMOL/L (ref 21–32)
CREAT SERPL-MCNC: 10.9 MG/DL (ref 0.6–1.3)
DIFFERENTIAL METHOD BLD: ABNORMAL
EOSINOPHIL # BLD: 0.1 K/UL (ref 0–0.4)
EOSINOPHIL NFR BLD: 2 % (ref 0–5)
ERYTHROCYTE [DISTWIDTH] IN BLOOD BY AUTOMATED COUNT: 13.7 % (ref 11.6–14.5)
GLOBULIN SER CALC-MCNC: 4.1 G/DL (ref 2–4)
GLUCOSE SERPL-MCNC: 81 MG/DL (ref 74–99)
HBV SURFACE AB SER QL IA: POSITIVE
HBV SURFACE AB SERPL IA-ACNC: 31.22 MIU/ML
HBV SURFACE AG SER QL: <0.1 INDEX
HBV SURFACE AG SER QL: NEGATIVE
HCT VFR BLD AUTO: 33.4 % (ref 36–48)
HEP BS AB COMMENT,HBSAC: NORMAL
HGB BLD-MCNC: 10.3 G/DL (ref 13–16)
LYMPHOCYTES # BLD: 1.3 K/UL (ref 0.9–3.6)
LYMPHOCYTES NFR BLD: 17 % (ref 21–52)
MCH RBC QN AUTO: 29.7 PG (ref 24–34)
MCHC RBC AUTO-ENTMCNC: 30.8 G/DL (ref 31–37)
MCV RBC AUTO: 96.3 FL (ref 74–97)
MONOCYTES # BLD: 0.6 K/UL (ref 0.05–1.2)
MONOCYTES NFR BLD: 7 % (ref 3–10)
NEUTS SEG # BLD: 5.7 K/UL (ref 1.8–8)
NEUTS SEG NFR BLD: 73 % (ref 40–73)
PHOSPHATE SERPL-MCNC: 6.5 MG/DL (ref 2.5–4.9)
PLATELET # BLD AUTO: 206 K/UL (ref 135–420)
PMV BLD AUTO: 11.1 FL (ref 9.2–11.8)
POTASSIUM SERPL-SCNC: 6.7 MMOL/L (ref 3.5–5.5)
PROT SERPL-MCNC: 7.8 G/DL (ref 6.4–8.2)
PTH-INTACT SERPL-MCNC: 1513.4 PG/ML (ref 18.4–88)
RBC # BLD AUTO: 3.47 M/UL (ref 4.7–5.5)
SODIUM SERPL-SCNC: 133 MMOL/L (ref 136–145)
WBC # BLD AUTO: 7.7 K/UL (ref 4.6–13.2)

## 2020-03-04 PROCEDURE — 36415 COLL VENOUS BLD VENIPUNCTURE: CPT

## 2020-03-04 PROCEDURE — 85025 COMPLETE CBC W/AUTO DIFF WBC: CPT

## 2020-03-04 PROCEDURE — 83970 ASSAY OF PARATHORMONE: CPT

## 2020-03-04 PROCEDURE — 90935 HEMODIALYSIS ONE EVALUATION: CPT

## 2020-03-04 PROCEDURE — 86706 HEP B SURFACE ANTIBODY: CPT

## 2020-03-04 PROCEDURE — 80053 COMPREHEN METABOLIC PANEL: CPT

## 2020-03-04 PROCEDURE — 99218 HC RM OBSERVATION: CPT

## 2020-03-04 PROCEDURE — 84100 ASSAY OF PHOSPHORUS: CPT

## 2020-03-04 PROCEDURE — 87340 HEPATITIS B SURFACE AG IA: CPT

## 2020-03-04 RX ORDER — SODIUM POLYSTYRENE SULFONATE 15 G/60ML
15 SUSPENSION ORAL; RECTAL
Status: DISCONTINUED | OUTPATIENT
Start: 2020-03-04 | End: 2020-03-04 | Stop reason: HOSPADM

## 2020-03-04 RX ORDER — CLOPIDOGREL BISULFATE 75 MG/1
75 TABLET ORAL DAILY
Status: DISCONTINUED | OUTPATIENT
Start: 2020-03-04 | End: 2020-03-04 | Stop reason: HOSPADM

## 2020-03-04 RX ORDER — CLOPIDOGREL BISULFATE 75 MG/1
75 TABLET ORAL DAILY
Qty: 30 TAB | Refills: 0 | Status: SHIPPED | OUTPATIENT
Start: 2020-03-04

## 2020-03-04 NOTE — PROGRESS NOTES
RENAL PROGRESS NOTE: Pt seen on dialysis. Follow up of ESRD     Present on Admission:   ESRD on dialysis [facility])   Essential hypertension   Hyperlipidemia   Coronary atherosclerosis of native coronary artery   Hypercholesteremia   Mitral valve insufficiency and aortic valve insufficiency   S/P CABG x 1   Nonrheumatic aortic valve stenosis   Cardiomyopathy ([address]         Subjective: Feels good, earlier received low dose of Kayexalate, says something has to be done about his Heart, [facility] sure [facility], [facility] ,so [facility]. Patient is on Dialysis.      Objective:    Patient Vitals for the past 12 hrs:   Temp Pulse Resp BP SpO2   03/04/20 1030 -- 65 -- 111/63 --   03/04/20 1015 -- 66 -- 119/72 --   03/04/20 1000 -- 60 -- 116/69 --   03/04/20 0945 -- 62 -- 114/70 --   03/04/20 0930 -- (!) 58 -- 113/69 --   03/04/20 0915 97.6 °F (36.4 °C) 61 18 (!) 115/92 --   03/04/20 0730 97.3 °F (36.3 °C) (!) 55 18 122/70 97 %   03/04/20 0033 97.4 °F (36.3 °C) 62 18 116/71 96 %      No intake or output data in the 24 hours ending 03/04/20 1054    Physical Assessment:     General Appearance: NAD  Lung: clear to auscultation  Heart: regular rate and rhythm and no murmurs, clicks or gallops  Lower Extremities: trace edema   Access: (L) arm AVF, not using any Heparin today    Labs    CBC w/Diff    Recent Labs     03/04/20  0400 03/03/20  1020   WBC 7.7 6.4   RBC 3.47* 3.39*   HGB 10.3* 10.2*   HCT 33.4* 32.6*   MCV 96.3 96.2   MCH 29.7 30.1   MCHC 30.8* 31.3   RDW 13.7 13.6    Recent Labs     03/04/20  0400 03/03/20  1020   MONOS 7 7   EOS 2 3   BASOS 1 1   RDW 13.7 13.6        Comprehensive Metabolic Profile    Recent Labs     03/04/20  0400 03/03/20  1020   * 140   K 6.7* 5.0    103   CO2 28 32   BUN 50* 41*   CREA 10.90* 9.25*    Recent Labs     03/04/20 0400 03/03/20  1020   CA 9.3  9.5 9.8   PHOS 6.5*  --    ALB 3.7  --    TP 7.8  --    SGOT 12  --    TBILI 0.8  -- Basic Metabolic Profile       results  reviwed. MEDS:Reviwed. Current Facility-Administered Medications   Medication Dose Route Frequency Provider Last Rate Last Dose    sodium polystyrene (KAYEXALATE) 15 gram/60 mL oral suspension 15 g  15 g Oral NOW Anastacia Frankel, MD        aspirin delayed-release tablet 162 mg  162 mg Oral DAILY Srinivas Quick MD        atorvastatin (LIPITOR) tablet 20 mg  20 mg Oral DAILY Srinivas Quick MD        carvediloL (COREG) tablet 25 mg  25 mg Oral BID WITH MEALS Srinivas Quick MD   25 mg at 03/03/20 1700    cholecalciferol (VITAMIN D3) (400 Units /10 mcg) tablet 2.5 Tab  1,000 Units Oral DAILY Srinivas Quick MD        cinacalcet tab 60 mg  60 mg Oral DAILY Srinivas Quick MD        lisinopril (PRINIVIL, ZESTRIL) tablet 5 mg  5 mg Oral DAILY Srinivas Quick MD        sevelamer carbonate (RENVELA) tab 800 mg  800 mg Oral TID WITH MEALS Srinivas Quick MD        sodium chloride (NS) flush 5-40 mL  5-40 mL IntraVENous Q8H Srinivas Quick MD   10 mL at 03/03/20 2117    sodium chloride (NS) flush 5-40 mL  5-40 mL IntraVENous PRN Srinivas Quick MD        acetaminophen (TYLENOL) tablet 650 mg  650 mg Oral Q4H PRN Srinivas Quick MD        temazepam (RESTORIL) capsule 15 mg  15 mg Oral QHS PRN Srinivas Quick MD        nitroglycerin (NITROLINGUAL) sublingual 0.4 mg/spray  1 Spray SubLINGual Q5MIN PRN Srinivas Quick MD        hydrALAZINE (APRESOLINE) 20 mg/mL injection 20 mg  20 mg IntraVENous Q30MIN PRN Sriinvas Quick MD        doxercalciferoL (HECTOROL) 4 mcg/2 mL injection 1 mcg  1 mcg IntraVENous DIALYSIS MON, WED & Leobardo Garcia MD           Impression:    ESRD: Tolerating HD well. Anemia of CKD: on  Epogen. Hyperparathyroidism Yes  Hyperkalemia  Plan  Dialysis for volume and solute management, UF 2500  Epogen  : no need today. Hectorol:1 microgram, await PTH result. Emforce Renal Diet.   HD q Mon,wed & Friday if he stays in hospital.          Aminta Casper MD

## 2020-03-04 NOTE — DISCHARGE INSTRUCTIONS
DISCHARGE SUMMARY from Nurse    PATIENT INSTRUCTIONS:    After general anesthesia or intravenous sedation, for 24 hours or while taking prescription Narcotics:  · Limit your activities  · Do not drive and operate hazardous machinery  · Do not make important personal or business decisions  · Do  not drink alcoholic beverages  · If you have not urinated within 8 hours after discharge, please contact your surgeon on call. Report the following to your surgeon:  · Excessive pain, swelling, redness or odor of or around the surgical area  · Temperature over 100.5  · Nausea and vomiting lasting longer than 4 hours or if unable to take medications  · Any signs of decreased circulation or nerve impairment to extremity: change in color, persistent  numbness, tingling, coldness or increase pain  · Any questions    What to do at Home:  Recommended activity: Activity as tolerated    If you experience any of the following symptoms chest and arm pain, dizziness or shortness of breath please follow up with primary care provider or closet emergency room. *  Please give a list of your current medications to your Primary Care Provider. *  Please update this list whenever your medications are discontinued, doses are      changed, or new medications (including over-the-counter products) are added. *  Please carry medication information at all times in case of emergency situations. These are general instructions for a healthy lifestyle:    No smoking/ No tobacco products/ Avoid exposure to second hand smoke  Surgeon General's Warning:  Quitting smoking now greatly reduces serious risk to your health.     Obesity, smoking, and sedentary lifestyle greatly increases your risk for illness    A healthy diet, regular physical exercise & weight monitoring are important for maintaining a healthy lifestyle    You may be retaining fluid if you have a history of heart failure or if you experience any of the following symptoms: Weight gain of 3 pounds or more overnight or 5 pounds in a week, increased swelling in our hands or feet or shortness of breath while lying flat in bed. Please call your doctor as soon as you notice any of these symptoms; do not wait until your next office visit. The discharge information has been reviewed with the patient and spouse. The patient and spouse verbalized understanding. Discharge medications reviewed with the patient and spouse and appropriate educational materials and side effects teaching were provided.   ___________________________________________________________________________________________________________________________________

## 2020-03-04 NOTE — PROGRESS NOTES
[facility]         Chart review indicating patient and his family would like him to have surgery at the [place]. Will sign off and be available if needed.

## 2020-03-04 NOTE — DIALYSIS
ANNY        ACUTE HEMODIALYSIS FLOW SHEET      HEMODIALYSIS ORDERS: Physician: Josafat Wynn     Dialyzer: revaclear   Duration: 4 hr  BFR: 400   DFR: 800   Dialysate:  Temp 36-37*C  K+   2    Ca+  2.5 Na 138 Bicarb 35   Weight:  76.3 kg    Patient Chart [x]     Unable to Obtain []  UF Goal: 4000   Access LUE fistula Needle Gauge 15   Heparin []  Bolus      Units    [] Hourly       Units    [x]None     Pre BP:   113/92    Pulse:     61       Respirations: 18  Temperature:   97.6   Labs: Pre        Post:        [x] N/A   Additional Orders(medications, blood products, hypotension management):      hectorol     [x] Anishita Consent Verified     CATHETER ACCESS: [x]N/A   []Right   [x]Left   []IJ     []Fem   []Transhepatic   [] First use X-ray verified     []Permanent                [] Temporary   []No S/S infection  []Redness  []Drainage []Cultured []Swelling []Pain   []Medical Aseptic Prep Utilized   []Dressing Changed  [] Biopatch     []Clotted   []Patent   Flows: []Good  []Poor  []Reversed   If access problem,  notified: []Yes    [x]N/A            GRAFT/FISTULA ACCESS:  []N/A     []Right     [x]Left     [x]UE     []LE   []AVG   [x]AVF        []Buttonhole    [x]Medical Aseptic Prep Utilized   [x]No S/S infection  []Redness  []Drainage []Cultured []Swelling []Pain    Bruit:   [x] Strong    [] Weak       Thrill :   [x] Strong    [] Weak       Needle Gauge: 15   Length: 1 inch   If access problem,  notified: []Yes     [x]N/A      Please describe access if present and not used: N/A                            GENERAL ASSESSMENT:      LUNGS:  Rate 17  SaO2%      [] N/A    [x] Clear  [] Coarse  [] Crackles  [] Wheezing        [] Diminished     Location : []RLL   []LLL    []RUL  []RAEGAN     Cough: []Productive  []Dry  [x]N/A   Respirations:  [x]Easy  []Labored     Therapy:   [x]RA  []NC l/min    Mask: []NRB []Venti       O2%                  []Ventilator  []Intubated  [] Trach  [] BiPaP     CARDIAC: [x]Regular [] Irregular   [] Pericardial Rub  [] JVD        []  Monitored  [] Bedside  [] Remotely monitored [] N/A  Rhythm:      EDEMA: [x] None  []Generalized  [] Pitting [] 1    [] 2    [] 3    [] 4                 [] Facial  [] Pedal  []  UE  [] LE     SKIN:   [x] Warm  [] Hot     [] Cold   [x] Dry     [] Pale   [] Diaphoretic                  [] Flushed  [] Jaundiced  [] Cyanotic  [] Rash  [] Weeping     LOC:    [x] Alert      []Oriented:    [x] Person     [x] Place  [x]Time               [] Confused  [] Lethargic  [] Medicated  [] Non-responsive     GI / ABDOMEN:  [] Flat    [] Distended    [x] Soft    [] Firm   []  Obese                             [] Diarrhea  [x] Bowel Sounds  [] Nausea  [] Vomiting       / URINE ASSESSMENT:[] Voiding   [] Oliguria  [x] Anuria   []  Boyd     [] Incontinent    []  Incontinent Brief      []  Bathroom Privileges       PAIN: [x] 0 []1  []2   []3   []4   []5   []6   []7   []8   []9   []10              Scale 0-10  Action/Follow Up:      MOBILITY: [] Amb    [] Amb/Assist    [x] Bed    [] Wheelchair  [] Stretcher      All Vitals and Treatment Details on Attached [address]: [facility]          Room # 407/01      [] 1st Time Acute  [] Stat  [] Routine  [] Urgent     [x] Acute Room  []  Bedside  [] ICU/CCU  [] ER   Isolation Precautions:   There are currently no Active Isolations      Special Considerations:         [] Blood Consent Verified []N/A     ALLERGIES: No Known Allergies            Code Status:No Order        Hepatitis Status:                   Lab Results   Component Value Date/Time    Hepatitis B surface Ag <0.10 03/04/2020 04:00 AM    Hepatitis B surface Ab 31.22 03/04/2020 04:00 AM                     Current Labs:   Lab Results   Component Value Date/Time    Sodium 133 (L) 03/04/2020 04:00 AM    Potassium 6.7 (HH) 03/04/2020 04:00 AM    Chloride 101 03/04/2020 04:00 AM    CO2 28 03/04/2020 04:00 AM    Anion gap 4 03/04/2020 04:00 AM    Glucose 81 03/04/2020 04:00 AM BUN 50 (H) 03/04/2020 04:00 AM    Creatinine 10.90 (H) 03/04/2020 04:00 AM    BUN/Creatinine ratio 5 (L) 03/04/2020 04:00 AM    GFR est AA 6 (L) 03/04/2020 04:00 AM    GFR est non-AA 5 (L) 03/04/2020 04:00 AM    Calcium 9.5 03/04/2020 04:00 AM    Calcium 9.3 03/04/2020 04:00 AM      Lab Results   Component Value Date/Time    WBC 7.7 03/04/2020 04:00 AM    Hemoglobin, POC 11.9 (L) 11/10/2010 07:21 AM    HGB 10.3 (L) 03/04/2020 04:00 AM    Hematocrit, POC 35 (L) 11/10/2010 07:21 AM    HCT 33.4 (L) 03/04/2020 04:00 AM    HEMATOCRIT 33.8 05/19/2010 02:45 PM    PLATELET 040 81/54/1509 04:00 AM    MCV 96.3 03/04/2020 04:00 AM                                                                                     DIET: DIET CARDIAC       PRIMARY NURSE REPORT: First initial/Last name/Title      Pre Dialysis: 0900     Time: Dalia Kelly RN      EDUCATION:    [x] Patient [] Other         Knowledge Basis: []None [x]Minimal [] Substantial   Barriers to learning [x]N/A   [] Access Care     [] S&S of infection     [] Fluid Management     []K+     [x]Procedural    []Albumin     [] Medications     [] Tx Options     [] Transplant     [] Diet     [] Other   Teaching Tools:  [x] Explain  [x] Demo  [] Handouts [] Video  Patient response:  [x] Verbalized understanding  [] Teach back  [] Return demonstration [] Requires follow up   Inappropriate due to            [x] Time Out/Safety Check  [x]Extracorporeal Circuit Tested for integrity       RO/HEMODIALYSIS MACHINE SAFETY CHECKS - Before each treatment:     Machine Number:                   [facility]                                   [x] Unit Machine # 8 with centralized RO                                              Alarm Test:  Pass time 4764              [x] RO/Machine Log Complete      Temp    36             Dialysate: pH  7.4 Conductivity: Meter   13.7     HD Machine   14.0                  TCD: 13.7  Dialyzer Lot # [id number]            Blood Tubing Lot # [id number] Saline Lot #  -[id number]     CHLORINE TESTING-Before each treatment and every 4 hours    Total Chlorine: [x] less than 0.1 ppm  Time: 0900 4 Hr/2nd Check Time: 1300   (if greater than 0.1 ppm from Primary then every 30 minutes from Secondary)     TREATMENT INITIATION - with Dialysis Precautions:   [x] All Connections Secured                 [x] Saline Line Double Clamped   [x] Venous Parameters Set                  [x] Arterial Parameters Set    [x] Prime Given 250ml                          [x]Air Foam Detector Engaged      Treatment Initiation Note:  pt arrived via transport in bed, pt is A&Ox4, no S/S of distress, VSS. tx started with out complications. AVF x2 15G, cannulation without difficulties. During Treatment Notes:  1000: pt resting, No S/S of distress, face and access in view. 1030:pt sleeping, No S/S of distress, face and access in view. 1100:pt sleeping, No S/S of distress, face and access in view. 1130:pt sleeping, No S/S of distress, face and access in view. 1200:pt sleeping, No S/S of distress, face and access in view. 1230:pt sleeping, No S/S of distress, face and access in view. 1245: turned off UF due to cramps, will keep UF off for remainder of treatment. 1300: pt wanting off, now due to cramps not going away. Medication Dose Volume Route Time [facility] name Title   hectorol 1 mcg 0.5 mL IV 1250 Brianna Huerta RN                   Post Assessment:   Dialyzer Cleared: [] Good [x] Fair  [] Poor  Blood processed:  76.7 L  UF Removed  2500 Ml  POst BP:   116/55       Pulse: 79        Respirations: 18  Temperature: 97.5 Lungs:     [x] Clear      [] Course         [] Crackles    [] Wheezing         [] Diminished   Post Tx Vascular Access:   AVF/AVG: Bleeding stopped   Art 5 min.  Brandon. 5 Min    Cardiac:   [x] Regular   [] Irregular   [] Monitor  [] N/A      Rhythm:       Catheter:   N/A    Skin:   Pain:    [x] Warm  [x] Dry [] Diaphoretic    [] Flushed    [] Pale [] Cyanotic [x]0  []1  []2 []3  []4   []5   []6   []7   []8   []9   []10     Post Treatment Note:    Pt leaving via transport, pt tolerated the tx, pt states they are good, no S/S of distress. POST TREATMENT PRIMARY NURSE HANDOFF REPORT:     First initial/Last name/Title         Post Dialysis: CRAIG Huerta RN  Time:  1330     Abbreviations: AVG-arterial venous graft, AVF-arterial venous fistula, IJ-Internal Jugular, Subcl-Subclavian, Fem-Femoral, Tx-treatment, AP/HR-apical heart rate, DFR-dialysate flow rate, BFR-blood flow rate, AP-arterial pressure, -venous pressure, UF-ultrafiltrate, TMP-transmembrane pressure, Brandon-Venous, Art-Arterial, RO-Reverse Osmosis

## 2020-03-04 NOTE — PROGRESS NOTES
Reason for Admission:  Coronary artery disease with stable angina pectoris ([facility] [address] [I25.118]  CAD (coronary artery disease) [I25.10]  Coronary atherosclerosis of native coronary artery [I25.10]                 RRAT Score:    13%            Plan for utilizing home health:    no                      Likelihood of Readmission:   LOW                         Transition of Care Plan:              Initial assessment completed with patient. Cognitive status of patient: oriented to time, place, person and situation. Face sheet information confirmed:  yes. The patient designates spouse,  to participate in his discharge plan and to receive any needed information. This patient lives in a single family home with patient and spouse. Patient is able to navigate steps as needed. Prior to hospitalization, patient was considered to be independent with ADLs/IADLS : yes . Patient has a current ACP document on file: yes  The patient and wife will be available to transport patient home upon discharge. The patient already has none reported, and  medical equipment available in the home. Patient is not currently active with home health. Patient has not stayed in a skilled nursing facility or rehab. This patient is on dialysis :no    Freedom of choice signed: no. Currently, the discharge plan is Home. The patient states that he can obtain his medications from the pharmacy, and take his medications as directed.     Patient's current insurance is medicare/       Care Management Interventions  [facility]: [facility]  Transition of Care Consult (CM Consult): Discharge Planning  Current Support Network: Lives with Spouse  Discharge Location  Discharge Placement: [address] [place] [address], [facility] [phone]

## 2020-03-04 NOTE — DISCHARGE SUMMARY
Discharge Summary     Patient: Ginna Laguerre MRN: [id number]  SSN: [id number]    YOB: 1945  Age: 76 y.o. Sex: male       Admit Date: 3/3/2020    Discharge Date: 3/4/2020      Admission Diagnoses: Coronary artery disease with stable angina pectoris [facility]) [I25.118]  CAD (coronary artery disease) [I25.10]  Coronary atherosclerosis of native coronary artery [I25.10]    Discharge Diagnoses:   Problem List as of 3/4/2020 Date Reviewed: 3/4/2020          Codes Class Noted - Resolved    CAD (coronary artery disease) ICD-10-CM: I25.10  ICD-9-CM: 414.00  3/3/2020 - Present        Hyperkalemia ICD-10-CM: E87.5  ICD-9-CM: 276.7  3/4/2020 - Present        Diastolic dysfunction OKX-91-XC: I51.89  ICD-9-CM: 429.9  3/3/2020 - Present        Pleural effusion on right ICD-10-CM: J90  ICD-9-CM: 511.9  1/9/2020 - Present    Overview Signed 1/9/2020  7:57 AM by Day Read MD     Status post tap at [facility]             SOB (shortness of breath) on exertion ICD-10-CM: R06.02  ICD-9-CM: 786.05  1/9/2020 - Present    Overview Signed 1/9/2020  7:57 AM by Day Read MD     Pleural effusion. Unclear etiology possible CHF although pulmonary etiology needs to be ruled out history of asbestosis             Cardiomyopathy [facility]) ICD-10-CM: I42.9  ICD-9-CM: 425.4  1/9/2020 - Present    Overview Signed 1/9/2020  8:26 AM by Day Read MD     40-45% ejection fraction on echo. 34 % on nuclear. Recheck echo. Add lisinopril             Nonrheumatic aortic valve stenosis ICD-10-CM: I35.0  ICD-9-CM: 424.1  1/9/2020 - Present    Overview Signed 1/9/2020  8:26 AM by Day Read MD     Mild on recent echo monitor             S/P CABG x 1 ICD-10-CM: Z95.1  ICD-9-CM: V45.81  11/17/2016 - Present    Overview Signed 11/17/2016 12:08 PM by Day Read MD     8/2016  done at [facility]             Chronic kidney disease, unspecified ICD-10-CM: N18.9  ICD-9-CM: 427. 9  Unknown - Present    Overview Signed 4/22/2013 10:29 PM by Alex MCDANIEL     On dialysis             Essential hypertension, benign ICD-10-CM: I10  ICD-9-CM: 401.1  Unknown - Present    Overview Addendum 4/22/2013 10:31 PM by Beulah Ford     Elevated today, on multiple meds  Usually normal at home and dialysis             Hypercholesteremia ICD-10-CM: E78.00  ICD-9-CM: 272.0  Unknown - Present    Overview Signed 4/22/2013 10:33 PM by Alex MCDANIEL     On Lipitor. LDL less than 60             * (Principal) Coronary atherosclerosis of native coronary artery ICD-10-CM: I25.10  ICD-9-CM: 414.01  Unknown - Present    Overview Addendum 3/3/2020 10:27 AM by Carisa Be MD     2/20 has dyspnea on exertion, previous CABG and abnormal stress test with inferolateral ischemia. Dyspnea may be angina equivalent. Cardiac cath recommended to evaluate coronaries and grafts.                  Pre-operative cardiovascular examination ICD-10-CM: Z01.810  ICD-9-CM: V72.81  Unknown - Present    Overview Signed 4/22/2013 10:36 PM by Alex MCDANIEL     For renal transplant             Hypercholesterolemia ICD-10-CM: E78.00  ICD-9-CM: 272.0  Unknown - Present        Mitral valve insufficiency and aortic valve insufficiency ICD-10-CM: I08.0  ICD-9-CM: 396.3  Unknown - Present    Overview Signed 4/22/2013 10:39 PM by Beulah Ford     Mod mr, mild ai             Other chronic pulmonary heart diseases ICD-10-CM: I27.89  ICD-9-CM: 416.8  Unknown - Present    Overview Signed 4/22/2013 10:41 PM by Beulah Ford     PAP 50 MMHG             ESRD on dialysis [facility]) ICD-10-CM: N18.6, Z99.2  ICD-9-CM: 585.6, V45.11  5/15/2012 - Present        Pre-transplant evaluation for end stage renal disease ICD-10-CM: Z01.818  ICD-9-CM: V72.83  5/15/2012 - Present        Essential hypertension ICD-10-CM: I10  ICD-9-CM: 401.9  5/15/2012 - Present        Anemia in chronic kidney disease(285.21) ICD-10-CM: N18.9, D63.1  ICD-9-CM: 285.21  5/15/2012 - Present        Secondary hyperparathyroidism of renal origin [facility]) ICD-10-CM: N25.81  ICD-9-CM: 588.81  5/15/2012 - Present        Hyperlipidemia ICD-10-CM: E78.5  ICD-9-CM: 272.4  5/15/2012 - Present        Iron deficiency anemia, unspecified ICD-10-CM: D50.9  ICD-9-CM: 280.9  5/15/2012 - Present        BPH (benign prostatic hypertrophy) ICD-10-CM: N40.0  ICD-9-CM: 600.00  5/15/2012 - Present        Gout ICD-10-CM: M10.9  ICD-9-CM: 274.9  5/15/2012 - Present        Osteomalacia, unspecified ICD-10-CM: M83.9  ICD-9-CM: 268.2  5/15/2012 - Present               Discharge Condition: Stable    : Physical Exam   Constitutional: He is oriented to person, place, and time. He appears well-developed. HENT:   Head: Normocephalic and atraumatic. Eyes: Pupils are equal, round, and reactive to light. Conjunctivae are normal.   Neck: Normal range of motion. Neck supple. Cardiovascular: Normal rate. Exam reveals no gallop. No murmur heard. Pulmonary/Chest: Effort normal and breath sounds normal. No respiratory distress. He has no wheezes. Abdominal: Soft. Musculoskeletal:         General: No tenderness, deformity or edema. Neurological: He is alert and oriented to person, place, and time. Skin: Skin is warm and dry. Hospital Course: patient underwent LHC on 3/3/20 that showed Severe three-vessel coronary disease with 90% proximal LAD, 90% mid LAD, 95% proximal circumflex, 100% OM1 with left to left collaterals, 100% mid RCA with left to right collaterals opacifying right PDA and right posterolateral branch. CTS consulted for CABG but patient and family wants to go to the [place]. Patient keep overnight for HD due hyperkalemia. Discussed with patient and wife there is a risk for Heart attack and sudden death as patient is living to go home. Will make appt to follow with Dr. Felicia Longo in 1-2 weeks. Start Plavix 75 mg daily      Consults: Nephrology    Significant Diagnostic Studies: labs:  Bmp, CBC    Disposition: home    Discharge Medications:   Current Discharge Medication List   Start Plavix 75 mg daily po    CONTINUE these medications which have NOT CHANGED    Details   cholecalciferol (VITAMIN D3) 400 unit tab tablet Take  by mouth two (2) times a day. aspirin 81 mg CpDR Take  by mouth.      cinacalcet (SENSIPAR) 30 mg tablet Take 60 mg by mouth daily. sevelamer carbonate (RENVELA) 800 mg Tab tab Take  by mouth three (3) times daily. loratadine (CLARITIN) 10 mg tablet Take 10 mg by mouth. carvedilol (COREG) 25 mg tablet Take 25 mg by mouth two (2) times daily (with meals). Oris Nicole lisinopril (PRINIVIL, ZESTRIL) 5 mg tablet Take 1 Tab by mouth daily. Qty: 90 Tab, Refills: 3      atorvastatin (LIPITOR) 20 mg tablet Take 1 Tab by mouth daily. Qty: 90 Tab, Refills: 3             Activity: Activity as tolerated and no driving for today  Diet: Renal Diet  Wound Care: Do not lift above 5 pounds for 24 hours. Do not lift above 10 pounds for one week. DO NOT [address] DISHES or VACUUM for one week. . NO HOT TUBS, SWIMMING POOLS OR TUB  BATHS UNTIL PUNCTURE SITE IS COMPLETELY HEALED. Wash puncture site(s) with soap and water and keep clean and dry. Observe for signs of infection or if you have a fever of 100 degrees, and call Cardiologist office. If any pulsitile bleeding or large swelling under the skin, lie down, apply pressure and call 911. Follow-up Appointments   Procedures    FOLLOW UP VISIT Appointment in: One Week Dr. James Chatman     Standing Status:   Standing     Number of Occurrences:   1     Order Specific Question:   Appointment in     Answer: One Week       Signed By: Toya English NP     March 4, 2020    I have independently evaluated and examined the patient. Explained to patient and his wife yesterday about the three-vessel disease that he has an cardiac surgery consult was requested. Dr. Kathrin Upton saw the patient and was preparing him for CABG but he wanted to go home.   When I saw him today he initially wanted to stay but then in an hour or so he changed his mind again and wanted to go home. He is being discharged with addition of Plavix as I am not sure when the surgery will happen in the [place] system. We have explained many times to patient that there will be a risk of acute MI which may be life-threatening. He seems to understand. He will be followed in the office closely with my partner, Dr. cMkenna Russ. All relevant labs and testing data are reviewed. Care plan discussed and updated after review.   Melissa Case MD

## 2020-03-04 NOTE — PROGRESS NOTES
[facility]      CARDIOLOGY PROGRESS NOTE  RECS:      1. CAD- Abnormal nuclear stress test with inferoapical ischemia. With wall motion abnormality. S/p LHC that showed Severe three-vessel coronary disease with 90% proximal LAD, 90% mid LAD, 95% proximal circumflex, 100% OM1 with left to left collaterals, 100% mid RCA with left to right collaterals opacifying right PDA and right posterolateral branch. CTS consulted for CABG but patient and family wants to go to the [place]. Spoke with patient while in HD and he will to stay for possible CABG. Will discuss with CTS team   2. Mitral valve insufficiency and aortic valve insufficiency. 3. Hypertension- intermittent borderline bp on bb and ACEi  4. ESRD- on HD    5. S/P CABG x 1  6. Cardiomyopathy- Worsening ejection fraction. 7. Hyperkalemia-in the setting contrast/iodine use for LHC- on HD- check BMP this evening             Echo 3/3/20  · Severe three-vessel coronary disease with 90% proximal LAD, 90% mid LAD, 95% proximal circumflex, 100% OM1 with left to left collaterals, 100% mid RCA with left to right collaterals opacifying right PDA and right posterolateral branch. · Mild LV dysfunction with ejection fraction in the range of 40 to 45% with diffuse hypokinesis and more hypokinesis in the posterior basal segment. · Elevated LV end-diastolic pressure 21 mmHg. · No significant aortic gradient at pullback-5 to 8 mm peak gradient. · Presumed totally occluded venous graft to LAD despite multiple catheters and absence of competitive flow during native injections. · 4 [place] catheters and right groin approach. · Left subclavian angiography with absence of any gradient between the aorta and left subclavian artery and good-sized proximal left internal mammary artery. Patient has lost his only venous graft to LAD. He has severe native three-vessel disease with heavily calcified vessels.   A bypass surgery would be best option for him if it can be performed with reasonable risk. I took a picture of subclavian artery on the left side and the proximal part of the LIMA appears to be good-sized vessel. We will consult our cardiac surgery department. ASSESSMENT:  Hospital Problems  Date Reviewed: 3/4/2020          Codes Class Noted POA    CAD (coronary artery disease) ICD-10-CM: I25.10  ICD-9-CM: 414.00  3/3/2020 Unknown        Hyperkalemia ICD-10-CM: E87.5  ICD-9-CM: 276.7  3/4/2020 Unknown        Diastolic dysfunction KGU-43-EO: I51.89  ICD-9-CM: 429.9  3/3/2020 Unknown        Cardiomyopathy ([address] ICD-10-CM: I42.9  ICD-9-CM: 425.4  1/9/2020 Yes    Overview Signed 1/9/2020  8:26 AM by Flaquita Everett MD     40-45% ejection fraction on echo. 34 % on nuclear. Recheck echo. Add lisinopril             Nonrheumatic aortic valve stenosis ICD-10-CM: I35.0  ICD-9-CM: 424.1  1/9/2020 Yes    Overview Signed 1/9/2020  8:26 AM by Flaquita Everett MD     Mild on recent echo monitor             S/P CABG x 1 ICD-10-CM: Z95.1  ICD-9-CM: V45.81  11/17/2016 Yes    Overview Signed 11/17/2016 12:08 PM by Flaquita Everett MD     8/2016  done at [facility]             Hypercholesteremia ICD-10-CM: E78.00  ICD-9-CM: 272.0  Unknown Yes    Overview Signed 4/22/2013 10:33 PM by Tami MCDANIEL     On Lipitor. LDL less than 60             * (Principal) Coronary atherosclerosis of native coronary artery ICD-10-CM: I25.10  ICD-9-CM: 414.01  Unknown Yes    Overview Addendum 3/3/2020 10:27 AM by Ty Huerta MD     2/20 has dyspnea on exertion, previous CABG and abnormal stress test with inferolateral ischemia. Dyspnea may be angina equivalent. Cardiac cath recommended to evaluate coronaries and grafts.                  Mitral valve insufficiency and aortic valve insufficiency ICD-10-CM: I08.0  ICD-9-CM: 396.3  Unknown Yes    Overview Signed 4/22/2013 10:39 PM by Beulah Mccormick     Mod mr, mild ai             ESRD on dialysis ([facility] ICD-10-CM: N18.6, Z99.2  ICD-9-CM: 585.6, V45.11  5/15/2012 Yes        Essential hypertension ICD-10-CM: I10  ICD-9-CM: 401.9  5/15/2012 Yes        Secondary hyperparathyroidism of renal origin [facility]) ICD-10-CM: N25.81  ICD-9-CM: 588.81  5/15/2012 Unknown        Hyperlipidemia ICD-10-CM: E78.5  ICD-9-CM: 272.4  5/15/2012 Yes                SUBJECTIVE:  No CP  No SOB    OBJECTIVE:    VS:   Visit Vitals  /65   Pulse 70   Temp 97.6 °F (36.4 °C) (Oral)   Resp 18   Wt 76.3 kg (168 lb 4.8 oz)   SpO2 97%   BMI 27.16 kg/m²       No intake or output data in the 24 hours ending 03/04/20 1317  TELE: normal sinus rhythm    General: alert, well developed, pleasant and in no apparent distress  HENT: Normocephalic, atraumatic. Normal external eye. Neck :  JVD difficult to assess due to obesity  Cardiac:  regular rate and rhythm, S1, S2 normal, no murmur, click, rub or gallop  Lungs: clear to auscultation bilaterally  Abdomen: Soft, nontender, no masses  Extremities:  No c/c/e, peripheral pulses present. Right groin site no hematoma. Labs: Results:       Chemistry Recent Labs     03/04/20  0400 03/03/20  1020   GLU 81 86   * 140   K 6.7* 5.0    103   CO2 28 32   BUN 50* 41*   CREA 10.90* 9.25*   CA 9.3  9.5 9.8   AGAP 4 5   BUCR 5* 4*   AP 66  --    TP 7.8  --    ALB 3.7  --    GLOB 4.1*  --    AGRAT 0.9  --       CBC w/Diff Recent Labs     03/04/20  0400 03/03/20  1020   WBC 7.7 6.4   RBC 3.47* 3.39*   HGB 10.3* 10.2*   HCT 33.4* 32.6*    224   GRANS 73 65   LYMPH 17* 24   EOS 2 3      Cardiac Enzymes No results for input(s): CPK, CKND1, JOSE D in the last 72 hours.     No lab exists for component: CKRMB, TROIP   Coagulation Recent Labs     03/03/20  1020   PTP 13.9   INR 1.1       Lipid Panel Lab Results   Component Value Date/Time    Cholesterol, total 97 05/03/2010 03:40 PM    HDL Cholesterol 29 (L) 05/03/2010 03:40 PM    LDL, calculated 54.2 05/03/2010 03:40 PM    VLDL, calculated 13.8 05/03/2010 03:40 PM    Triglyceride 69 05/03/2010 03:40 PM    CHOL/HDL Ratio 3.3 05/03/2010 03:40 PM      BNP No results for input(s): BNPP in the last 72 hours.    Liver Enzymes Recent Labs     03/04/20  0400   TP 7.8   ALB 3.7   AP 66   SGOT 12      Thyroid Studies No results found for: T4, T3U, TSH, TSHEXT           Raquel Mckinney NP-C [place]:[phone]

## 2020-03-05 NOTE — PROGRESS NOTES
Patient refused medications and assessments stating that he was told that he was going home and that he didn't want anything, but to leave.

## 2020-03-06 DIAGNOSIS — I08.0 MITRAL VALVE INSUFFICIENCY AND AORTIC VALVE INSUFFICIENCY: Primary | ICD-10-CM

## 2020-03-06 DIAGNOSIS — I10 ESSENTIAL HYPERTENSION: ICD-10-CM

## 2020-03-06 RX ORDER — ATORVASTATIN CALCIUM 40 MG/1
40 TABLET, FILM COATED ORAL DAILY
Qty: 30 TAB | Refills: 3 | Status: SHIPPED | OUTPATIENT
Start: 2020-03-06

## 2020-03-06 RX ORDER — ATORVASTATIN CALCIUM 40 MG/1
40 TABLET, FILM COATED ORAL DAILY
Qty: 30 TAB | Refills: 3 | Status: SHIPPED | OUTPATIENT
Start: 2020-03-06 | End: 2020-03-06 | Stop reason: SDUPTHER

## 2020-03-06 NOTE — TELEPHONE ENCOUNTER
Called patient and let know after receiving lab results, per paper note from Dr. Chrystal Mcgrath LDL came back elevated so will increase Lipitor to 40 mg daily and recheck blood work in 6 week. Patient verbalized understanding. Needs to be printed for patient to  today and take to [place].

## 2020-04-02 ENCOUNTER — VIRTUAL VISIT (OUTPATIENT)
Dept: CARDIOLOGY CLINIC | Age: 75
End: 2020-04-02

## 2020-04-02 ENCOUNTER — OFFICE VISIT (OUTPATIENT)
Dept: CARDIOLOGY CLINIC | Age: 75
End: 2020-04-02

## 2020-04-02 VITALS
BODY MASS INDEX: 26.68 KG/M2 | WEIGHT: 166 LBS | OXYGEN SATURATION: 100 % | HEIGHT: 66 IN | HEART RATE: 81 BPM | DIASTOLIC BLOOD PRESSURE: 70 MMHG | TEMPERATURE: 98.7 F | SYSTOLIC BLOOD PRESSURE: 130 MMHG

## 2020-04-02 DIAGNOSIS — I50.43 ACUTE ON CHRONIC COMBINED SYSTOLIC AND DIASTOLIC CONGESTIVE HEART FAILURE (HCC): ICD-10-CM

## 2020-04-02 DIAGNOSIS — N18.6 ESRD ON DIALYSIS (HCC): ICD-10-CM

## 2020-04-02 DIAGNOSIS — Z99.2 ESRD ON DIALYSIS (HCC): ICD-10-CM

## 2020-04-02 DIAGNOSIS — I42.9 CARDIOMYOPATHY, UNSPECIFIED TYPE (HCC): ICD-10-CM

## 2020-04-02 DIAGNOSIS — I25.118 ATHEROSCLEROSIS OF NATIVE CORONARY ARTERY OF NATIVE HEART WITH OTHER FORM OF ANGINA PECTORIS (HCC): Primary | ICD-10-CM

## 2020-04-02 DIAGNOSIS — I35.0 NONRHEUMATIC AORTIC VALVE STENOSIS: ICD-10-CM

## 2020-04-02 DIAGNOSIS — Z95.1 S/P CABG X 1: ICD-10-CM

## 2020-04-02 DIAGNOSIS — I10 ESSENTIAL HYPERTENSION, BENIGN: ICD-10-CM

## 2020-04-02 NOTE — PROGRESS NOTES
HISTORY OF PRESENT ILLNESS  Billie Zamora is a 76 y.o. male. Patient with cad,mr,ai,hcvd. On follow up patient denies any chest pains,sob, palpitation or other significant symptoms. cabg at [facility]-8/2016 1/2020  Patient is seen today for cardiac evaluation. His increased shortness of breath. He was noted to have a right pleural effusion which was tapped at [facility].  Shortness of breath is improved since then he denies any chest pain at present. 2/2020  Patient is here for follow up of diagnostic tests. Results will be discussed. 4/2020  Patient is seen after his recent cardiac catheterization. He has increased shortness of breath on minimal activity. His bypass graft is occluded he has severe three-vessel coronary disease    Hypertension   The history is provided by the patient. This is a chronic problem. The problem occurs constantly. The problem has not changed since onset. Associated symptoms include shortness of breath. Pertinent negatives include no chest pain. Review of Systems   Constitutional: Negative for chills and fever. HENT: Negative for nosebleeds. Eyes: Negative for blurred vision and double vision. Respiratory: Positive for shortness of breath. Negative for cough, hemoptysis, sputum production and wheezing. Cardiovascular: Negative for chest pain, palpitations, orthopnea, claudication, leg swelling and PND. Gastrointestinal: Negative for heartburn, nausea and vomiting. Musculoskeletal: Negative for myalgias. Skin: Negative for rash. Neurological: Negative for dizziness and weakness. Endo/Heme/Allergies: Does not bruise/bleed easily.      Family History   Problem Relation Age of Onset    Hypertension Other        Past Medical History:   Diagnosis Date    Anemia NEC     Anemia in chronic kidney disease     BPH (benign prostatic hypertrophy)     Chronic kidney disease, unspecified     On dialysis    Coronary atherosclerosis of native coronary artery Stable, no angina    ESRD on [facility]) 5/18/10    Essential hypertension, benign     Elevated today, on multiple meds    Gout     Hypercholesterolemia     Hyperlipidemia     Mitral valve insufficiency and aortic valve insufficiency     Mod mr, mild ai    Osteomalacia, unspecified     Other and unspecified hyperlipidemia     On Lipitor. LDL less than 60    Other chronic pulmonary heart diseases     PAP 50 MMHG    Pre-operative cardiovascular examination     For renal transplant    Pre-transplant evaluation for end stage renal disease     Renal mass     Secondary hyperparathyroidism (of renal origin)     Unspecified essential hypertension        Past Surgical History:   Procedure Laterality Date    HX KNEE ARTHROSCOPY      HX NEPHRECTOMY Right     HX ORTHOPAEDIC      HX OTHER SURGICAL      Hx dialysis catheter     HX OTHER SURGICAL      Renal shunt    VASCULAR SURGERY PROCEDURE UNLIST  5/2010       No Known Allergies    Current Outpatient Medications   Medication Sig    atorvastatin (LIPITOR) 40 mg tablet Take 1 Tab by mouth daily.  clopidogreL (PLAVIX) 75 mg tab Take 1 Tab by mouth daily.  loratadine (CLARITIN) 10 mg tablet Take 10 mg by mouth.  carvedilol (COREG) 25 mg tablet Take 25 mg by mouth two (2) times daily (with meals).  lisinopril (PRINIVIL, ZESTRIL) 5 mg tablet Take 1 Tab by mouth daily.  cholecalciferol (VITAMIN D3) 400 unit tab tablet Take  by mouth two (2) times a day.  aspirin 81 mg CpDR Take  by mouth.  cinacalcet (SENSIPAR) 30 mg tablet Take 60 mg by mouth daily.  doxycycline (ADOXA) 100 mg tablet Take 100 mg by mouth two (2) times a day.  sevelamer carbonate (RENVELA) 800 mg Tab tab Take  by mouth three (3) times daily. No current facility-administered medications for this visit.         Visit Vitals  /70 (BP 1 Location: Left arm, BP Patient Position: Sitting)   Pulse 81   Temp 98.7 °F (37.1 °C) (Oral)   Ht 5' 6\" (1.676 m)   Wt 75.3 kg (166 lb)   SpO2 100%   BMI 26.79 kg/m²         Physical Exam   Constitutional: He is oriented to person, place, and time. He appears well-developed and well-nourished. HENT:   Head: Normocephalic and atraumatic. Eyes: Conjunctivae are normal.   Neck: Neck supple. No JVD present. No tracheal deviation present. No thyromegaly present. Cardiovascular: Normal rate and regular rhythm. Exam reveals no gallop and no friction rub. Murmur heard. Holosystolic murmur is present with a grade of 2/6 at the lower left sternal border. Early systolic murmur is also present at the upper right sternal border and lower left sternal border. Pulmonary/Chest: Breath sounds normal. No respiratory distress. He has no wheezes. He has no rales. He exhibits no tenderness. Abdominal: Soft. There is no abdominal tenderness. Musculoskeletal:         General: No edema. Neurological: He is alert and oriented to person, place, and time. Skin: Skin is warm and dry. Psychiatric: He has a normal mood and affect. No flowsheet data found. CORONARY ANGIOGRAPHY:cath:2010  Left Main Coronary Artery: The left main coronary artery is somewhat irregular and, like all of the coronaries, is somewhat aneurysmally dilated. The left main divides into the left anterior descending and circumflex branches in typical fashion. Left Anterior Descending Coronary Artery: The left anterior descending coronary artery gives rise to a very large first diagonal vessel. This vessel runs parallel to the left anterior descending over the anterolateral surface of the heart. The diagonal bifurcates terminally. The left anterior descending in the mid segment beyond the takeoff of the diagonal has a long area of 80% narrowing. Distally, the left anterior descending is a normal-caliber vessel and courses to the apex. The proximal portion of the left anterior descending at the site of the diagonal takeoff is aneurysmally dilated.     Left Circumflex Coronary Artery: The left circumflex coronary artery consists of a total of 4 marginal vessels. The first is occluded with faint filling of the ghost distal vessel. The second is small and free of significant narrowing. The third has about 40% stenosis proximally but is small. A fourth is small and free of disease. The fifth marginal has some 30-40% narrowing at its origin. The entire AV groove portion of the circumflex is aneurysmally dilated and irregular. Right Coronary Artery: The right coronary artery is dominant, giving rise to a small posterior descending branch and a posterior left ventricular branch. There is serial 20% stenosis in the proximal right with aneurysmal dilatation of the proximal right. The posterior descending is a small vessel and has a 90% stenosis distally. Beyond the stenosis, the vessel was extremely small and not suitable for coronary intervention or coronary artery bypass grafting. The posterior left ventricular branch of the right is totally occluded proximally and also is a tiny vessel. FINAL DIAGNOSES:  1. NORMAL LEFT VENTRICULAR FUNCTION. 2. THREE-VESSEL CORONARY ARTERY DISEASE. 3. ANEURYSMALLY DILATED CORONARY ARTERIES THAT ARE IRREGULAR AND ECTATIC. RECOMMENDATION: Mr. Katy Nicole will have his risk factors optimized; specifically, he will be placed on aspirin and be certain that his lipid profile reflects a target LDL of less than 70. He will need functional testing to assess the possibility of the need for coronary intervention to the left anterior descending. He does have disease involving the first marginal, which is occluded; the posterolateral system of the right, which is occluded; and the posterior descending branch of the right, which has a high-grade stenosis but is a tiny vessel.  However, the obtuse marginal, the posterolateral, and the posterior descending vessels are all exceedingly small and not suitable for coronary intervention or coronary artery bypass grafting. Ek2016  Sinus  Rhythm   -Left bundle branch block and left axis. ABNORMAL     SUMMARY:echo:2016  Left ventricle: Systolic function was normal. Ejection fraction was  estimated to be 55 %. No obvious wall motion abnormalities identified in  the views obtained. There was mild concentric hypertrophy. Doppler  parameters were consistent with abnormal left ventricular relaxation  (grade 1 diastolic dysfunction). Left atrium: The atrium was dilated. Mitral valve: There was trivial regurgitation. Pulmonic valve: Transpulmonic velocity was increased due to increased  transvalvular flow. There was mild regurgitation. 2016  cabg  sinle vg to lad    [facility]  2020- nuclear stress test at [facility]  Inferior inferoapical ischemia. Wall motion abnormality with 34% ejection fraction  Interpretation Summary 2020    · Normal cavity size. Mild concentric hypertrophy. Moderate systolic dysfunction. Estimated left ventricular ejection fraction is 35 - 40%. Left ventricular global hypokinesis. Moderate (grade 2) left ventricular diastolic dysfunction. · Moderately dilated right ventricle. · Moderately dilated right atrium. · Aortic valve leaflet calcification present. Aortic valve mean gradient is 10.9 mmHg. Mild aortic valve stenosis is present. · Mitral valve thickening. Mild mitral valve regurgitation is present. · Mild tricuspid valve regurgitation is present. Pulmonary arterial systolic pressure is 78.2 mmHg. Moderate pulmonary hypertension. · Mild pulmonic valve regurgitation is present. Conclusion cath-3/2020       · Severe three-vessel coronary disease with 90% proximal LAD, 90% mid LAD, 95% proximal circumflex, 100% OM1 with left to left collaterals, 100% mid RCA with left to right collaterals opacifying right PDA and right posterolateral branch.   · Mild LV dysfunction with ejection fraction in the range of 40 to 45% with diffuse hypokinesis and more hypokinesis in the posterior basal segment. · Elevated LV end-diastolic pressure 21 mmHg. · No significant aortic gradient at pullback-5 to 8 mm peak gradient. · Presumed totally occluded venous graft to LAD despite multiple catheters and absence of competitive flow during native injections. · 4 [place] catheters and right groin approach. · Left subclavian angiography with absence of any gradient between the aorta and left subclavian artery and good-sized proximal left internal mammary artery. Patient has lost his only venous graft to LAD. He has severe native three-vessel disease with heavily calcified vessels. A bypass surgery would be best option for him if it can be performed with reasonable risk. I took a picture of subclavian artery on the left side and the proximal part of the LIMA appears to be good-sized vessel. We will consult our cardiac surgery department. Assessment       ICD-10-CM ICD-9-CM    1. Atherosclerosis of native coronary artery of native heart with other form of angina pectoris ([address] I25.118 414.01 REFERRAL TO CARDIOTHORACIC SURGEON     413.9     Severe three-vessel disease on recent cath with occluded graft. Will refer for bypass surgery   2. S/P CABG x 1 Z95.1 V45.81 REFERRAL TO CARDIOTHORACIC SURGEON    Occluded vein graft to LAD on recent cath   3. Essential hypertension, benign I10 401.1     Stable   4. ESRD on dialysis ([address] N18.6 585.6     Z99.2 V45.11     Continue treatment monitor   5. Acute on chronic combined systolic and diastolic congestive heart failure (HCC) I50.43 428.43      428.0     Shortness of breath on and off recently worse likely ischemic cardiomyopathy. Had pleural tap in past   6. Cardiomyopathy, unspecified type ([address] I42.9 425.4     35% ejection fraction recent echo monitor. 7. Nonrheumatic aortic valve stenosis I35.0 424.1     Mild on echo monitor       There are no discontinued medications.    1/2020  Patient referred back from [place]. Recent nuclear stress test shows ischemia in inferior septal and apical area with some areas of fixed defect . anterior wall was not reported to have any defect. This likely indicates that vein graft to LAD is patent. Patient had diffuse disease in other areas so likely not surprising that he has defect in other area. His ejection fraction was reported at 34%. His echo from 8/2019 showed 40 to 45% ejection fraction. He had moderate pulmonary hypertension and aortic valve disease. We will follow-up echo to see the ejection fraction if it has dropped further. I have added lisinopril he is already on Coreg  2/2020  Continue current medication. LV ejection fraction is reduced. With abnormal stress test prior CABG and history of diffuse disease in other area will proceed further with evaluation by cardiac catheterization. 4/2020  Severe three-vessel disease with cardiomyopathy. Occluded vein graft to LAD. Will refer for coronary artery bypass surgery. Aortic valve appears mildly stenosed likely would not require intervention. Ejection fraction worse to 35%      Orders Placed This Encounter    REFERRAL TO CARDIOTHORACIC SURGEON     Referral Priority:   Routine     Referral Type:   Consultation     Referral Reason:   Specialty Services Required     Referred to Provider:   Xiang Lazaro MD     Requested Specialty:   Cardiothoracic Surgery     Number of Visits Requested:   1       Follow-up and Dispositions    · Return in about 3 months (around 7/2/2020).

## 2020-04-09 ENCOUNTER — TELEPHONE (OUTPATIENT)
Dept: CARDIOTHORACIC SURGERY | Age: 75
End: 2020-04-09

## 2020-04-09 NOTE — TELEPHONE ENCOUNTER
Called pt to set up consult with Dr. Bianka Johnson, S/w pt's wife - she will have pt call office when he returns to set appt.

## 2020-04-13 ENCOUNTER — TELEPHONE (OUTPATIENT)
Dept: CARDIOTHORACIC SURGERY | Age: 75
End: 2020-04-13

## 2020-04-16 ENCOUNTER — TELEPHONE (OUTPATIENT)
Dept: CARDIOTHORACIC SURGERY | Age: 75
End: 2020-04-16

## 2020-04-16 NOTE — TELEPHONE ENCOUNTER
Spoke with pt about scheduling virtual consult appt with Dr. Miguelina Abdul. Pt stated that he is waiting for approval from the [place] before scheduling appt.

## 2020-08-05 ENCOUNTER — TELEPHONE (OUTPATIENT)
Dept: CARDIOTHORACIC SURGERY | Age: 75
End: 2020-08-05

## 2021-05-13 ENCOUNTER — OFFICE VISIT (OUTPATIENT)
Dept: CARDIOLOGY CLINIC | Age: 76
End: 2021-05-13
Payer: MEDICARE

## 2021-05-13 VITALS
DIASTOLIC BLOOD PRESSURE: 72 MMHG | SYSTOLIC BLOOD PRESSURE: 122 MMHG | TEMPERATURE: 97.3 F | BODY MASS INDEX: 23.75 KG/M2 | WEIGHT: 147.8 LBS | HEIGHT: 66 IN | HEART RATE: 81 BPM

## 2021-05-13 DIAGNOSIS — I25.118 ATHEROSCLEROSIS OF NATIVE CORONARY ARTERY OF NATIVE HEART WITH OTHER FORM OF ANGINA PECTORIS (HCC): Primary | ICD-10-CM

## 2021-05-13 DIAGNOSIS — I35.0 NONRHEUMATIC AORTIC VALVE STENOSIS: ICD-10-CM

## 2021-05-13 DIAGNOSIS — Z99.2 ESRD ON DIALYSIS (HCC): ICD-10-CM

## 2021-05-13 DIAGNOSIS — N18.6 ESRD ON DIALYSIS (HCC): ICD-10-CM

## 2021-05-13 DIAGNOSIS — I10 ESSENTIAL HYPERTENSION, BENIGN: ICD-10-CM

## 2021-05-13 DIAGNOSIS — I50.42 CHRONIC COMBINED SYSTOLIC AND DIASTOLIC CONGESTIVE HEART FAILURE (HCC): ICD-10-CM

## 2021-05-13 DIAGNOSIS — J90 PLEURAL EFFUSION: ICD-10-CM

## 2021-05-13 DIAGNOSIS — I42.9 CARDIOMYOPATHY, UNSPECIFIED TYPE (HCC): ICD-10-CM

## 2021-05-13 PROCEDURE — 99214 OFFICE O/P EST MOD 30 MIN: CPT | Performed by: INTERNAL MEDICINE

## 2021-05-13 PROCEDURE — 3017F COLORECTAL CA SCREEN DOC REV: CPT | Performed by: INTERNAL MEDICINE

## 2021-05-13 PROCEDURE — G9231 DOC ESRD DIA TRANS PREG: HCPCS | Performed by: INTERNAL MEDICINE

## 2021-05-13 PROCEDURE — G8536 NO DOC ELDER MAL SCRN: HCPCS | Performed by: INTERNAL MEDICINE

## 2021-05-13 PROCEDURE — G8427 DOCREV CUR MEDS BY ELIG CLIN: HCPCS | Performed by: INTERNAL MEDICINE

## 2021-05-13 PROCEDURE — G8420 CALC BMI NORM PARAMETERS: HCPCS | Performed by: INTERNAL MEDICINE

## 2021-05-13 PROCEDURE — G8510 SCR DEP NEG, NO PLAN REQD: HCPCS | Performed by: INTERNAL MEDICINE

## 2021-05-13 PROCEDURE — 1101F PT FALLS ASSESS-DOCD LE1/YR: CPT | Performed by: INTERNAL MEDICINE

## 2021-05-13 NOTE — PROGRESS NOTES
HISTORY OF PRESENT ILLNESS  Ana Sykes is a 76 y.o. male. Patient with cad,mr,ai,hcvd. On follow up patient denies any chest pains,sob, palpitation or other significant symptoms. cabg at [facility]-8/2016 1/2020  Patient is seen today for cardiac evaluation. His increased shortness of breath. He was noted to have a right pleural effusion which was tapped at [facility].  Shortness of breath is improved since then he denies any chest pain at present. 2/2020  Patient is here for follow up of diagnostic tests. Results will be discussed. 4/2020  Patient is seen after his recent cardiac catheterization. He has increased shortness of breath on minimal activity. His bypass graft is occluded he has severe three-vessel coronary disease  5/2021  Patient is here after recent admission at both [facility] and [address] [place] [address].  He had recurrent right pleural effusion ended up having VATS. He is recovering slowly. Short of breath on exertion. Currently in wheelchair. Denies any edema. No chest pain. Since his last evaluation he was evaluated by cardiothoracic surgery and was referred by [place] to surgeons in [facility] where it was felt that he would not be benefiting from coronary artery bypass surgery again. He has remained chest pain-free since then. Hypertension  The history is provided by the patient. This is a chronic problem. The problem occurs constantly. The problem has not changed since onset. Associated symptoms include shortness of breath. Pertinent negatives include no chest pain. New Patient  Associated symptoms include shortness of breath. Pertinent negatives include no chest pain. Review of Systems   Constitutional: Negative for chills and fever. HENT: Negative for nosebleeds. Eyes: Negative for blurred vision and double vision. Respiratory: Positive for shortness of breath. Negative for cough, hemoptysis, sputum production and wheezing. Cardiovascular: Negative for chest pain, palpitations, orthopnea, claudication, leg swelling and PND. Gastrointestinal: Negative for heartburn, nausea and vomiting. Musculoskeletal: Negative for myalgias. Skin: Negative for rash. Neurological: Negative for dizziness and weakness. Endo/Heme/Allergies: Does not bruise/bleed easily. Family History   Problem Relation Age of Onset    Hypertension Other        Past Medical History:   Diagnosis Date    Anemia NEC     Anemia in chronic kidney disease     BPH (benign prostatic hypertrophy)     Chronic kidney disease, unspecified     On dialysis    Coronary atherosclerosis of native coronary artery     Stable, no angina    ESRD on dialysis ([facility] [address] 5/18/10    Essential hypertension, benign     Elevated today, on multiple meds    Gout     Hypercholesterolemia     Hyperlipidemia     Mitral valve insufficiency and aortic valve insufficiency     Mod mr, mild ai    Osteomalacia, unspecified     Other and unspecified hyperlipidemia     On Lipitor. LDL less than 60    Other chronic pulmonary heart diseases     PAP 50 MMHG    Pre-operative cardiovascular examination     For renal transplant    Pre-transplant evaluation for end stage renal disease     Renal mass     Secondary hyperparathyroidism (of renal origin)     Unspecified essential hypertension        Past Surgical History:   Procedure Laterality Date    HX KNEE ARTHROSCOPY      HX NEPHRECTOMY Right     HX ORTHOPAEDIC      HX OTHER SURGICAL      Hx dialysis catheter     HX OTHER SURGICAL      Renal shunt    VASCULAR SURGERY PROCEDURE UNLIST  5/2010       No Known Allergies    Current Outpatient Medications   Medication Sig    atorvastatin (LIPITOR) 40 mg tablet Take 1 Tab by mouth daily.  clopidogreL (PLAVIX) 75 mg tab Take 1 Tab by mouth daily.  loratadine (CLARITIN) 10 mg tablet Take 10 mg by mouth.     carvedilol (COREG) 25 mg tablet Take 25 mg by mouth two (2) times daily (with meals).  lisinopril (PRINIVIL, ZESTRIL) 5 mg tablet Take 1 Tab by mouth daily.  cholecalciferol (VITAMIN D3) 400 unit tab tablet Take  by mouth two (2) times a day.  aspirin 81 mg CpDR Take  by mouth.  cinacalcet (SENSIPAR) 30 mg tablet Take 60 mg by mouth daily. No current facility-administered medications for this visit. Visit Vitals  /72 (BP 1 Location: Right arm, BP Patient Position: Sitting, BP Cuff Size: Adult)   Pulse 81   Temp 97.3 °F (36.3 °C) (Temporal)   Ht 5' 6\" (1.676 m)   Wt 67 kg (147 lb 12.8 oz)   BMI 23.86 kg/m²         Physical Exam   Constitutional: He is oriented to person, place, and time. He appears well-developed and well-nourished. HENT:   Head: Normocephalic and atraumatic. Eyes: Conjunctivae are normal.   Neck: Neck supple. No JVD present. No tracheal deviation present. No thyromegaly present. Cardiovascular: Normal rate and regular rhythm. Exam reveals no gallop and no friction rub. Murmur heard. Holosystolic murmur is present with a grade of 2/6 at the lower left sternal border. Early systolic murmur is also present at the upper right sternal border and lower left sternal border. Pulmonary/Chest: Breath sounds normal. No respiratory distress. He has no wheezes. He has no rales. He exhibits no tenderness. Abdominal: Soft. There is no abdominal tenderness. Musculoskeletal:         General: No edema. Neurological: He is alert and oriented to person, place, and time. Skin: Skin is warm and dry. Psychiatric: He has a normal mood and affect. No flowsheet data found. CORONARY ANGIOGRAPHY:cath:2010  Left Main Coronary Artery: The left main coronary artery is somewhat irregular and, like all of the coronaries, is somewhat aneurysmally dilated. The left main divides into the left anterior descending and circumflex branches in typical fashion.     Left Anterior Descending Coronary Artery: The left anterior descending coronary artery gives rise to a very large first diagonal vessel. This vessel runs parallel to the left anterior descending over the anterolateral surface of the heart. The diagonal bifurcates terminally. The left anterior descending in the mid segment beyond the takeoff of the diagonal has a long area of 80% narrowing. Distally, the left anterior descending is a normal-caliber vessel and courses to the apex. The proximal portion of the left anterior descending at the site of the diagonal takeoff is aneurysmally dilated. Left Circumflex Coronary Artery: The left circumflex coronary artery consists of a total of 4 marginal vessels. The first is occluded with faint filling of the ghost distal vessel. The second is small and free of significant narrowing. The third has about 40% stenosis proximally but is small. A fourth is small and free of disease. The fifth marginal has some 30-40% narrowing at its origin. The entire AV groove portion of the circumflex is aneurysmally dilated and irregular. Right Coronary Artery: The right coronary artery is dominant, giving rise to a small posterior descending branch and a posterior left ventricular branch. There is serial 20% stenosis in the proximal right with aneurysmal dilatation of the proximal right. The posterior descending is a small vessel and has a 90% stenosis distally. Beyond the stenosis, the vessel was extremely small and not suitable for coronary intervention or coronary artery bypass grafting. The posterior left ventricular branch of the right is totally occluded proximally and also is a tiny vessel. FINAL DIAGNOSES:  1. NORMAL LEFT VENTRICULAR FUNCTION. 2. THREE-VESSEL CORONARY ARTERY DISEASE. 3. ANEURYSMALLY DILATED CORONARY ARTERIES THAT ARE IRREGULAR AND ECTATIC. RECOMMENDATION: Mr. Cesar Busby will have his risk factors optimized; specifically, he will be placed on aspirin and be certain that his lipid profile reflects a target LDL of less than 70.  He will need functional testing to assess the possibility of the need for coronary intervention to the left anterior descending. He does have disease involving the first marginal, which is occluded; the posterolateral system of the right, which is occluded; and the posterior descending branch of the right, which has a high-grade stenosis but is a tiny vessel. However, the obtuse marginal, the posterolateral, and the posterior descending vessels are all exceedingly small and not suitable for coronary intervention or coronary artery bypass grafting. Ek2016  Sinus  Rhythm   -Left bundle branch block and left axis. ABNORMAL     SUMMARY:echo:2016  Left ventricle: Systolic function was normal. Ejection fraction was  estimated to be 55 %. No obvious wall motion abnormalities identified in  the views obtained. There was mild concentric hypertrophy. Doppler  parameters were consistent with abnormal left ventricular relaxation  (grade 1 diastolic dysfunction). Left atrium: The atrium was dilated. Mitral valve: There was trivial regurgitation. Pulmonic valve: Transpulmonic velocity was increased due to increased  transvalvular flow. There was mild regurgitation. 2016  cabg  sinle vg to lad    [facility]  2020- [facility] test at [facility]  Inferior inferoapical ischemia. Wall motion abnormality with 34% ejection fraction  Interpretation Summary 2020    · Normal cavity size. Mild concentric hypertrophy. Moderate systolic dysfunction. Estimated left ventricular ejection fraction is 35 - 40%. Left ventricular global hypokinesis. Moderate (grade 2) left ventricular diastolic dysfunction. · Moderately dilated right ventricle. · Moderately dilated right atrium. · Aortic valve leaflet calcification present. Aortic valve mean gradient is 10.9 mmHg. Mild aortic valve stenosis is present. · Mitral valve thickening. Mild mitral valve regurgitation is present.   · Mild tricuspid valve regurgitation is present. Pulmonary arterial systolic pressure is 39.2 mmHg. Moderate pulmonary hypertension. · Mild pulmonic valve regurgitation is present. Conclusion cath-3/2020       · Severe three-vessel coronary disease with 90% proximal LAD, 90% mid LAD, 95% proximal circumflex, 100% OM1 with left to left collaterals, 100% mid RCA with left to right collaterals opacifying right PDA and right posterolateral branch. · Mild LV dysfunction with ejection fraction in the range of 40 to 45% with diffuse hypokinesis and more hypokinesis in the posterior basal segment. · Elevated LV end-diastolic pressure 21 mmHg. · No significant aortic gradient at pullback-5 to 8 mm peak gradient. · Presumed totally occluded venous graft to LAD despite multiple catheters and absence of competitive flow during native injections. · 4 [place] catheters and right groin approach. · Left subclavian angiography with absence of any gradient between the aorta and left subclavian artery and good-sized proximal left internal mammary artery. Patient has lost his only venous graft to LAD. He has severe native three-vessel disease with heavily calcified vessels. A bypass surgery would be best option for him if it can be performed with reasonable risk. I took a picture of subclavian artery on the left side and the proximal part of the LIMA appears to be good-sized vessel. We will consult our cardiac surgery department. PET/CT - SKULL BASE TO MID THIGH2/25/2021  [facility]  Result Impression     1. No convincing PET evidence for malignancy. 2. Diffuse mild, presumed physiologic uptake involving atelectatic/collapsed right lung, secondary to very large right pleural effusion. Suggest right thoracentesis with cytologic evaluation.      ECHOCARDIOGRAM COMPLETE  (DOPPLER ECHO)2/16/2021  [facility]  Component Name Value Ref Range   EF Echo 35     Result Impression   :   NORMAL LEFT VENTRICULAR CAVITY SIZE AND WALL THICKNESS. MODERATELY REDUCED LEFT VENTRICULAR SYSTOLIC FUNCTION WITH AN EJECTION FRACTION OF 35%. GLOBAL HYPOKINESIS WITH SEPTAL BOUNCE NOTED. GRADE III DIASTOLIC DYSFUNCTION. SEVERELY DILATED LEFT ATRIUM. MODERATELY DILATED RIGHT VENTRICULAR SIZE WITH MILDLY REDUCED FUNCTION. MILD MULTIJET MITRAL REGURGITATION. AORTIC STENOSIS IS MILD BY CALCULATION. RIGHT CORONARY CUSP APPEARS FIXED. PEAK AORTIC VALVE   VELOCITY  CM/S , PEAK GRADIENT IS 22 MMHG WITH A MEAN GRADIENT OF  10 MMHG. THE   CALCULATED AORTIC VALVE AREA IS 1.6 CM2. MODERATE TRICUSPID REGURGITATION. ESTIMATED PULMONARY ARTERY PRESSURE IS 57 MMHG. PLEURAL EFFUSION PRESENT. JOSE D REST/STRESS MULTIPLE [place]  Other Result Information   This result has an attachment that is not available. Result Narrative   CONCLUSIONS    * Myocardial perfusion study is abnormal.    * Inferior myocardial scar; no myocardial ischemia.    * Normal left ventricular function with calculated LVEF 29 %. CHEST PA AND LATERAL. .. if not done within 30 days3/30/2021  [address]  Result Impression    Moderate right pleural effusion with associated atelectasis. Impression - ABNORMAL ECG - 3/2021     Impression SR-Sinus rhythm-normal P axis, V-rate 50-99     Impression VPC-Ventricular premature complex-V complex w/ short R-R interval     Impression 1AVB-Prolonged WA interval-WA >220, V-rate 50-90     Impression NIVCDL-Nonspecific IVCD with LAD-QRSd >115mS & LAD     Impression [id number] T abnormalities, lateral leads-T flat/neg, I aVL V5 V6        Assessment       ICD-10-CM ICD-9-CM    1. Atherosclerosis of native coronary artery of native heart with other form of angina pectoris ([facility] [address]  I25.118 414.01      413.9     Stable. Not considered surgical candidate by surgical evaluation in past   2.  Chronic combined systolic and diastolic congestive heart failure (HCC)  I50.42 428.42 REFERRAL TO CARDIAC ELECTROPHYSIOLOGY     428.0     Short of breath on exertion. Multifactorial class III   3. Essential hypertension, benign  I10 401.1     Stable monitor   4. ESRD on dialysis ([address]  N18.6 585.6     Z99.2 V45.11    5. Pleural effusion  J90 511.9     Recent right pleural effusion s/p VATS. 6. Mild aortic stenosis. Continue to monitor  I35.0 424.1    7. Cardiomyopathy, unspecified type ([address]  I42.9 425.4 REFERRAL TO CARDIAC ELECTROPHYSIOLOGY    Ejection fraction remains low 35% on recent echo. Continue to monitor       There are no discontinued medications. 1/2020  Patient referred back from [place]. Recent nuclear stress test shows ischemia in inferior septal and apical area with some areas of fixed defect . anterior wall was not reported to have any defect. This likely indicates that vein graft to LAD is patent. Patient had diffuse disease in other areas so likely not surprising that he has defect in other area. His ejection fraction was reported at 34%. His echo from 8/2019 showed 40 to 45% ejection fraction. He had moderate pulmonary hypertension and aortic valve disease. We will follow-up echo to see the ejection fraction if it has dropped further. I have added lisinopril he is already on Coreg  2/2020  Continue current medication. LV ejection fraction is reduced. With abnormal stress test prior CABG and history of diffuse disease in other area will proceed further with evaluation by cardiac catheterization. 4/2020  Severe three-vessel disease with cardiomyopathy. Occluded vein graft to LAD. Will refer for coronary artery bypass surgery. Aortic valve appears mildly stenosed likely would not require intervention. Ejection fraction worse to 35%  5/2021  Recent admission with pleural effusion s/p VATS. Limited functional capacity class III shortness of breath. CHF with cardiomyopathy ejection fraction remains low. IVCD with left bundle branch block on EKG  Discussed the option of ICD.   We will set him up to evaluate with EP. Cardiomyopathy with IVCD. Patient has CAD but not considered a surgical candidate again was evaluated by surgeon at [facility].  Orders Placed This [address]     Referral Priority:   Routine     Referral Type:   Consultation     Referral Reason:   Specialty Services Required     Referred to Provider:   Stefani Chin MD       Follow-up and Dispositions    · Return in about 3 months (around 8/13/2021).

## 2022-02-04 ENCOUNTER — HOSPITAL ENCOUNTER (EMERGENCY)
Age: 77
Discharge: HOME | End: 2022-02-04
Attending: EMERGENCY MEDICINE
Payer: MEDICARE

## 2022-02-04 VITALS
SYSTOLIC BLOOD PRESSURE: 117 MMHG | OXYGEN SATURATION: 100 % | HEART RATE: 84 BPM | TEMPERATURE: 97.4 F | HEIGHT: 66 IN | BODY MASS INDEX: 24.11 KG/M2 | RESPIRATION RATE: 16 BRPM | WEIGHT: 150 LBS | DIASTOLIC BLOOD PRESSURE: 70 MMHG

## 2022-02-04 DIAGNOSIS — T82.9XXA COMPLICATION OF ARTERIOVENOUS DIALYSIS FISTULA, INITIAL ENCOUNTER: Primary | ICD-10-CM

## 2022-02-04 PROCEDURE — 99283 EMERGENCY DEPT VISIT LOW MDM: CPT

## 2022-02-04 PROCEDURE — 99282 EMERGENCY DEPT VISIT SF MDM: CPT

## 2022-02-04 NOTE — ED NOTES
No active bleeding noted on bandage. Pt denies any pain or discomfort, requesting to leave. MD aware.

## 2022-02-04 NOTE — ED TRIAGE NOTES
Pt arrived via EMS from dialysis where pt completed dialysis and access device wouldn't stop bleeding. Clamps in place holding pressure since 1030. Pt is alert and awake.

## 2022-02-04 NOTE — ED PROVIDER NOTES
EMERGENCY DEPARTMENT HISTORY AND PHYSICAL EXAM    12:18 PM patient seen at this time in room 7      Date: 2/4/2022  Patient Name: Michael Lopez. History of Presenting Illness     Chief Complaint   Patient presents with    Other     vascular access bleeding          History Provided By: patient    Additional History (Context): Michael Gates is a 68 y.o. male presents with ESRD-D just completed dialysis and fistula will not stop bleeding fistula located in his left forearm. 2 clamps in place. Patient without other complaint. Kerri Maynard PCP: None    Chief Complaint:   Duration:    Timing:    Location:   Quality:   Severity:   Modifying Factors:   Associated Symptoms:       Current Outpatient Medications   Medication Sig Dispense Refill    atorvastatin (LIPITOR) 40 mg tablet Take 1 Tab by mouth daily. 30 Tab 3    clopidogreL (PLAVIX) 75 mg tab Take 1 Tab by mouth daily. 30 Tab 0    loratadine (CLARITIN) 10 mg tablet Take 10 mg by mouth.  carvedilol (COREG) 25 mg tablet Take 25 mg by mouth two (2) times daily (with meals).  lisinopril (PRINIVIL, ZESTRIL) 5 mg tablet Take 1 Tab by mouth daily. 90 Tab 3    cholecalciferol (VITAMIN D3) 400 unit tab tablet Take  by mouth two (2) times a day.  aspirin 81 mg CpDR Take  by mouth.  cinacalcet (SENSIPAR) 30 mg tablet Take 60 mg by mouth daily.          Past History     Past Medical History:  Past Medical History:   Diagnosis Date    Anemia NEC     Anemia in chronic kidney disease     BPH (benign prostatic hypertrophy)     Chronic kidney disease, unspecified     On dialysis    Coronary atherosclerosis of native coronary artery     Stable, no angina    ESRD on dialysis ([facility] [address] 5/18/10    Essential hypertension, benign     Elevated today, on multiple meds    Gout     Hypercholesterolemia     Hyperlipidemia     Mitral valve insufficiency and aortic valve insufficiency     Mod mr, mild ai    Osteomalacia, unspecified     Other and unspecified hyperlipidemia     On Lipitor. LDL less than 60    Other chronic pulmonary heart diseases     PAP 50 MMHG    Pre-operative cardiovascular examination     For renal transplant    Pre-transplant evaluation for end stage renal disease     Renal mass     Secondary hyperparathyroidism (of renal origin)     Unspecified essential hypertension        Past Surgical History:  Past Surgical History:   Procedure Laterality Date    HX KNEE ARTHROSCOPY      HX NEPHRECTOMY Right     HX ORTHOPAEDIC      HX OTHER SURGICAL      Hx dialysis catheter     HX OTHER SURGICAL      Renal shunt    VASCULAR SURGERY PROCEDURE UNLIST  5/2010       Family History:  Family History   Problem Relation Age of Onset    Hypertension Other        Social History:  Social History     Tobacco Use    Smoking status: Never Smoker    Smokeless tobacco: Never Used   Substance Use Topics    Alcohol use: Yes     Alcohol/week: 0.0 standard drinks     Comment: occasional alcohol use    Drug use: No       Allergies:  No Known Allergies      Review of Systems     Review of Systems   Constitutional: Negative for diaphoresis and fever. HENT: Negative for congestion and sore throat. Eyes: Negative for pain and itching. Respiratory: Negative for cough and shortness of breath. Cardiovascular: Negative for chest pain and palpitations. Gastrointestinal: Negative for abdominal pain and diarrhea. Endocrine: Negative for polydipsia and polyuria. Genitourinary: Negative for dysuria and hematuria. Musculoskeletal: Negative for arthralgias and myalgias. Skin: Negative for rash and wound. Neurological: Negative for seizures and syncope. Hematological: Does not bruise/bleed easily. Psychiatric/Behavioral: Negative for agitation and hallucinations.          Physical Exam       Patient Vitals for the past 12 hrs:   Temp Pulse Resp BP SpO2   02/04/22 1221 97.4 °F (36.3 °C) 84 16 117/70 100 %       IPVITALS  Patient Vitals for the past 24 hrs:   BP Temp Pulse Resp SpO2 Height Weight   02/04/22 1221 117/70 97.4 °F (36.3 °C) 84 16 100 % 5' 6\" (1.676 m) 68 kg (150 lb)       Physical Exam  Vitals and nursing note reviewed. Constitutional:       General: He is not in acute distress. Appearance: He is well-developed. He is not toxic-appearing. HENT:      Head: Normocephalic and atraumatic. Eyes:      General: No scleral icterus. Conjunctiva/sclera: Conjunctivae normal.   Neck:      Vascular: No JVD. Cardiovascular:      Rate and Rhythm: Normal rate and regular rhythm. Pulmonary:      Effort: Pulmonary effort is normal. No respiratory distress. Musculoskeletal:         General: Normal range of motion. Cervical back: Normal range of motion and neck supple. Comments: Left forearm fistula with 2 dialysis clamps no visible bleeding. Skin:     General: Skin is warm and dry. Neurological:      Mental Status: He is alert. Psychiatric:         Thought Content: Thought content normal.         Judgment: Judgment normal.           Diagnostic Study Results   Labs -  No results found for this or any previous visit (from the past 24 hour(s)). Radiologic Studies -   No orders to display     No results found. Medications ordered:   Medications - No data to display      Medical Decision Making   Initial Medical Decision Making and DDx:       ED Course: Progress Notes, Reevaluation, and Consults:  ED Course as of 02/04/22 1336   Fri Feb 04, 2022   1235 Clamps removed, taped and folded 4 x 4's remain, good palpable thrill, no bleeding, just scant blood evidence on the 4 x 4's they are not saturating. [CB]      ED Course User Index  [CB] Russ Lara MD     1:37 PM Pt reevaluated at this time. Discussed results and findings, as well as, diagnosis and plan for discharge. Follow up with doctors/services listed. Return to the emergency department for alarming symptoms. Pt verbalizes understanding and agreement with plan.  All questions addressed. Patient observed, no further bleeding. I am the first provider for this patient. I reviewed the vital signs, available nursing notes, past medical history, past surgical history, family history and social history. Patient Vitals for the past 12 hrs:   Temp Pulse Resp BP SpO2   02/04/22 1221 97.4 °F (36.3 °C) 84 16 117/70 100 %       Vital Signs-Reviewed the patient's vital signs. Pulse Oximetry Analysis, Cardiac Monitor, 12 lead ekg:      Interpreted by the EP. Records Reviewed: Nursing notes reviewed (Time of Review: 12:18 PM)    Procedures:   Critical Care Time:   Aspirin: (was aspirin given for stroke?)    Diagnosis     Clinical Impression:   1. Complication of arteriovenous dialysis fistula, initial encounter        Disposition:       Follow-up Information     Follow up With Specialties Details Why Contact [facility] EMERGENCY DEPT Emergency Medicine  As needed [address] [facility]  [phone]           Patient's Medications   Start Taking    No medications on file   Continue Taking    ASPIRIN 81 MG CPDR    Take  by mouth. ATORVASTATIN (LIPITOR) 40 MG TABLET    Take 1 Tab by mouth daily. CARVEDILOL (COREG) 25 MG TABLET    Take 25 mg by mouth two (2) times daily (with meals). CHOLECALCIFEROL (VITAMIN D3) 400 UNIT TAB TABLET    Take  by mouth two (2) times a day. CINACALCET (SENSIPAR) 30 MG TABLET    Take 60 mg by mouth daily. CLOPIDOGREL (PLAVIX) 75 MG TAB    Take 1 Tab by mouth daily. LISINOPRIL (PRINIVIL, ZESTRIL) 5 MG TABLET    Take 1 Tab by mouth daily. LORATADINE (CLARITIN) 10 MG TABLET    Take 10 mg by mouth.    These Medications have changed    No medications on file   Stop Taking    No medications on file     _______________________________    Notes:    Nina Olguin MD using Dragon dictation      _______________________________

## (undated) DEVICE — PACK PROCEDURE SURG VASC CATH 161 MMC LF

## (undated) DEVICE — CATHETER ANGIO 4FR L100CM COR NYL AR MOD W/O SIDE H RADPQ

## (undated) DEVICE — PROCEDURE KIT FLUID MGMT 10 FR CUST MAINFOLD

## (undated) DEVICE — SET FLD ADMIN 3 W STPCOCK FIX FEM L BOR 1IN

## (undated) DEVICE — GUIDEWIRE VASC L260CM DIA0035IN TIP L3MM PTFE J STD TAPR FIX

## (undated) DEVICE — CATHETER ANGIO 4FR L100CM S STL NYL IM 3 SEG BRAID SFT

## (undated) DEVICE — CATHETER ANGIO 4FR L100CM S STL NYL LCB 3 SEG BRAID SFT

## (undated) DEVICE — INTRODUCER SHTH 4FR CANN L11CM DIL TIP 25MM RED TUNGSTEN

## (undated) DEVICE — COVER US PRB W15XL120CM W/ GEL RUBBERBAND TAPE STRP FLD GEN

## (undated) DEVICE — CATHETER ANGIO 4FR L100CM S STL NYL MPA 2 W/ 2 SIDE H 3 SEG

## (undated) DEVICE — CATHETER ANGIO 4FR L100CM S STL NYL JL4 3 SEG BRAID SFT

## (undated) DEVICE — PRESSURE MONITORING SET: Brand: TRUWAVE

## (undated) DEVICE — CATHETER DIAG AD 4FR L100CM STD NYL JUDKINS R 4 TRULUMEN

## (undated) DEVICE — COPILOT BLEEDBACK CONTROL VALVE: Brand: COPILOT